# Patient Record
Sex: FEMALE | Race: WHITE | Employment: OTHER | URBAN - METROPOLITAN AREA
[De-identification: names, ages, dates, MRNs, and addresses within clinical notes are randomized per-mention and may not be internally consistent; named-entity substitution may affect disease eponyms.]

---

## 2017-10-12 ENCOUNTER — TRANSFERRED RECORDS (OUTPATIENT)
Dept: HEALTH INFORMATION MANAGEMENT | Facility: CLINIC | Age: 67
End: 2017-10-12

## 2018-02-12 ENCOUNTER — TELEPHONE (OUTPATIENT)
Dept: TRANSPLANT | Facility: CLINIC | Age: 68
End: 2018-02-12

## 2018-02-12 NOTE — TELEPHONE ENCOUNTER
02/12/2018 10:33 AM Phone (Outgoing) Chantal Bernal (Self) [phone] (H) Remove    Missing or Invalid Number - 3rd attempt to complete intake, daughter nbr stated 'mailbox is full, unable to leave message at this time'. called pt mobile nbr, lft a  msg, pts home nbr is an invalid nbr, rang 1x then disconnected. the son's nbr did the same, invalid nbr.             By Shea Sheffield LPN

## 2018-02-12 NOTE — TELEPHONE ENCOUNTER
Late entry:  2/7- 1st attempt to contact pt and complete intake, lft vm msg on mobile ph, the home nbr rang once and disconnected, a non working nbr.     2/8- received med records, 2nd attempt to contact pt to complete intake, lft a vm msg on the mobile nbr and daughter listed as an emergency contact.

## 2018-05-21 ENCOUNTER — DOCUMENTATION ONLY (OUTPATIENT)
Dept: TRANSPLANT | Facility: CLINIC | Age: 68
End: 2018-05-21

## 2018-05-21 NOTE — LETTER
May 21, 2018    Chantal Bernal  [address]  [place] [address]      Dear Ms. Bernal,      You were referred on 1/31/2018 to the Solid Organ Transplant Program by Dr Дмитрий Eddy to begin the evaluation process for lung transplant.  We have made several attempts to get in touch with you, but have not been able to reach you.     If you are interested and/or have any questions, please contact us at [phone] or [phone]. If we do not hear from you in the next 10 business days we will close your referral.     We look forward to hearing from you.     Thank you,    Lung Transplant Team  [facility]                     Sincerely,       ***    CC:***

## 2018-05-21 NOTE — PROGRESS NOTES
Received call back from patient. Briefly discussed intake process. Scheduled patient to completed RN intake interview on 5/30 at 1000.  Discussed possible NPT visit June 18 either AM/PM visit currently available. Patient agrees with plan.

## 2018-05-21 NOTE — PROGRESS NOTES
Called patient to complete NPT intake, discuss transplant process, and schedule NPT visit. Patient was unavailable at time of call. I left a message with contact information requesting patient call back. A plan was made to call patient if no return call occurs. Multiple attempts made to contact patient, will send patient a letter indicating inability to contact patient and potential closure of lung transplant referral if not return call received.

## 2018-05-30 ENCOUNTER — TELEPHONE (OUTPATIENT)
Dept: TRANSPLANT | Facility: CLINIC | Age: 68
End: 2018-05-30

## 2018-05-30 NOTE — TELEPHONE ENCOUNTER
Called patient during prescheduled time to complete lung transplant intake referral interview. No answer, left VM with transplant coordinator contact information, requesting a call back. Will attempt to contact patient again, if not call back will close referral and send letter.

## 2018-06-05 ENCOUNTER — TELEPHONE (OUTPATIENT)
Dept: TRANSPLANT | Facility: CLINIC | Age: 68
End: 2018-06-05

## 2018-07-16 ENCOUNTER — OFFICE VISIT (OUTPATIENT)
Dept: TRANSPLANT | Facility: CLINIC | Age: 68
End: 2018-07-16
Attending: INTERNAL MEDICINE
Payer: MEDICARE

## 2018-07-16 VITALS
OXYGEN SATURATION: 98 % | SYSTOLIC BLOOD PRESSURE: 110 MMHG | WEIGHT: 126.4 LBS | BODY MASS INDEX: 23.12 KG/M2 | HEART RATE: 97 BPM | TEMPERATURE: 98 F | DIASTOLIC BLOOD PRESSURE: 67 MMHG

## 2018-07-16 DIAGNOSIS — R09.02 HYPOXIA: ICD-10-CM

## 2018-07-16 DIAGNOSIS — J43.9 PULMONARY EMPHYSEMA, UNSPECIFIED EMPHYSEMA TYPE (H): Primary | ICD-10-CM

## 2018-07-16 PROCEDURE — G0463 HOSPITAL OUTPT CLINIC VISIT: HCPCS | Mod: ZF

## 2018-07-16 RX ORDER — ALBUTEROL SULFATE 90 UG/1
AEROSOL, METERED RESPIRATORY (INHALATION)
Refills: 0 | COMMUNITY
Start: 2018-06-22

## 2018-07-16 RX ORDER — ACETAMINOPHEN 500 MG
TABLET ORAL EVERY 6 HOURS PRN
COMMUNITY

## 2018-07-16 RX ORDER — ALBUTEROL SULFATE 0.83 MG/ML
SOLUTION RESPIRATORY (INHALATION)
COMMUNITY
Start: 2018-04-19

## 2018-07-16 RX ORDER — BUDESONIDE 1 MG/2ML
1 INHALANT ORAL
COMMUNITY
Start: 2017-05-03

## 2018-07-16 RX ORDER — AZITHROMYCIN 500 MG/1
TABLET, FILM COATED ORAL
Refills: 4 | COMMUNITY
Start: 2018-05-25 | End: 2020-01-01

## 2018-07-16 ASSESSMENT — PAIN SCALES - GENERAL: PAINLEVEL: NO PAIN (0)

## 2018-07-16 NOTE — PATIENT INSTRUCTIONS
Pulmonary Rehab: Please remember to stay active.  Continue exercises learned in pulmonary rehab or continue participating in pulmonary rehab, if able.     Current oxygen use: Rest 0L  Activity 2L  Sleep 2L     Data Unavailable 126 lbs 6.4 oz Body mass index is 23.12 kg/(m^2).  Goal BMI greater than 18, less than 30 for lung transplant.     Medication changes:  No medication changes at this time  Future orders: Will discuss transplant evaluation  Antibody blood test (PRA) due every 3 months:  Will complete with lung transplant evaluation  Next appointment: Will schedule RTC visit with transplant MD    Test or procedures to be complete prior to next appointment:   PFTs: Not completed today, will complete during lung transplant evalaution   6MW: MD requesting a titration study by completed today   CT Scan: Not completed today will complete during lung transplant evaluation    Please remember to stay up to date with your primary care requirements including: Annual check-ups with primary care physician   Mammogram   Pap smear (as appropriate for women)    PSA (for men over 50)   Dental visits   Annual flu shots/ immunizations as needed   Colonoscopy (patients over 50).     Thank-you for allowing us to participate in your care.    If your condition should change, please contact your transplant coordinator. This includes: worsening symptoms, need for antibiotics, hospitalizations, transfusions.    Thoracic Transplant Office phone [phone], option 2, fax [phone]    Office Hours 8:30 - 5:00 pm

## 2018-07-16 NOTE — PROGRESS NOTES
Pre - Lung Transplant Clinic  Initial Consultation  2018       Patient: Chantal Bernal  MRN: [id number]  : 1950 (age 67 year old)    Primary Care Provider: Sujatha Mayfield    HPI:       Dear Dr. Mayfield, today I had the pleasure of meeting Chantal Bernal in the [facility] Pre-Lung Transplantation Clinic to assess her candidacy for moving forward with completing the full pre-lung transplantation evaluation. As you know her history, I will be brief. 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 She report she is unable to walk on a incline.  She is currently on Ventolin 2 puffs q4 prn, albuterol nebs q4 prn, Duonebs q4 prn, Symbicort 160-4.5 2 puffs bid, Prednisone taper with maintenance dose 5 mg daily, Spiriva 1 cap daily, Singulair 10 mg daily, Cetirizine 10 mg daily. She reports she has not recently been to pulmonary rehab. Clinically her subjective complaints and her pulmonary testing (per KIESHA score) suggest she has very advanced obstructive disease and is an apropropriate candidate to be referred for evaluation for Lung Transplantation. I do have several concerns which can be addressed during the pre-lung transplant evaluation which include: Her history of malignancy, osteoporosis, and what appears to be her chronic dependence on steroids. In summary. I believe Chantal Bernal is a good candidate to proceed with the full pre- lung transplantation evaluation   Assessment & Plan:         Recommendations  1. Encourage daily physical activity  2. Strongly encourage participation in Pulmonary Rehabilitation  3. Encourage continued follow-up with your Primary  Pulmonologist for management of your lung disease and with your Primary Care Physician for age- and gender-specific health care maintenance   4. Remain current on immunizations and vaccination  5. Continue ongoing tobacco cessation.     Lung tx consideration:     A. I spent quite some time discussing both the lung transplantation evaluation listing process including complications that can be expected post lung transplantation.      B. One of the main points I did reinforce is that the survival post lung transplantation at this time is around 50 to 55% at 5 years. The main reason for this is infection and/or rejection. While most bacterial infections are treatable, the viral infections can cause significant morbidity and mortality. Acute rejection is usually treatable with high dose steroids but chronic rejection (YOSVANY) as you already know does not have good therapy as of date. The other main point is that there is minimal to no survival advantage with lung transplantation.     C. The lung transplant evaluation will involve multiple tests and meeting with cardiothoracic surgeon, Transplant , Nutritionist etc., from our transplant team. Post testing, we will discuss the details at our transplant meeting and will be listed if she meets the criteria for lung transplantation.      D. Once on the list, Chantal can expect to stay on the list anywhere from few months to few years, depending on the LAS score. Also the other factors that might play a role is number of organs required and whether she is positive for panel reactive antibodies. She has not recieved transfusions to date. She will need to stay within a 50 mile radius of our center for the first three months after the lung transplantation (after hospital discharge).     E. Post lung transplantation, she will require multiple bronchoscopies to evaluate for infections and/or rejections. The major complications post transplantation experienced by most of our  patients include: 1. Diabetes, about 30 to 40% of our patients remain on insulin for the rest of their life. 2. Chronic kidney disease, with majority losing about 50% of the kidney function and up to 5 ot 10% requiring hemodialysis and/or renal transplantation. 3. Hypertension, usually well controlled with medications. 4. Malignancy: Especially of skin cancer, and/or lymphoma - usually treatable. The above is not an exhaustive list of all the complications. The others include also airway complications occurring in about 5% of the patients requiring bronchoscopy with bronchial dilation, sometimes stent placement. There is increased risk for DVT and PE particularly in the first six months after transplantation.      F. Discussed with Chantal that lung transplantation is an option. The other option is to continue as is and continue cares with us or with her local pulmonologist. We will glad to have palliative care team involved in your care to help manage your symptoms.     F. Discussed with Chantal that lung transplantation is an option. If she is not interested in lung tx then will continue current treatment regimen and manage her sx. She can continue cares with us or with her local pulmonologist. We will be glad to have palliative care team involved in your care to help manage your symptoms.    Variable Points on KIESHA* Index: 0 1 2 3   FEV1 (% predicted) > or = 65 50 - 64 36 - 49 < or = 35   6-Minute Walk Test (meters) > or = 350 250 - 349 150 - 249 < or = 149   MMRC Dyspnea Scale 0 - 1 2 3 4   Body Mass Index > or = 21 < or = 21       * KIESHA stands for Body mass index, Airflow Obstruction, Dyspnea per MMRC and Exercise capacity.      Review of Systems:      ROS: 10 point ROS neg other than the symptoms noted above in the HPI.    .I have reviewed the Medications, Allergies, Past Medical and Surgical History, and Social History with the patient    Medical and Surgical History:     Past Medical History:   Diagnosis Date      COPD, severe (H)      Osteoporosis        Past Surgical History:   Procedure Laterality Date     OTHER SURGICAL HISTORY      Stated had a hysterectomy at [facility] around 2012.     Social and Family History:     Social History     Social History     Marital status: Single     Spouse name: N/A     Number of children: N/A     Years of education: N/A     Occupational History     Not on file.     Social History Main Topics     Smoking status: Former Smoker     Packs/day: 1.00     Years: 47.00     Types: Cigarettes     Quit date: 1/1/2012     Smokeless tobacco: Never Used     Alcohol use No     Drug use: No     Sexual activity: Not on file     Other Topics Concern     Not on file     Social History Narrative       Family History   Problem Relation Age of Onset     Cancer Brother      throat     Lung Cancer Paternal Uncle      Allergies and Home Medications:        Allergies   Allergen Reactions     Arformoterol Nausea and Vomiting and Shortness Of Breath     Codeine Unknown and Shortness Of Breath     Trouble breathing     Morphine GI Disturbance         (Not in a hospital admission)  Physical Exam:       GEN: Pleasant, in no acute distress, sitting upright in chair. Looking older than stated age.  HEENT: Pupils equal and reactive to light, conjunctiva are pink conjunctivae, sclera anicteric,  Oral mucosa moist without lesions.  NECK: supple no JVD/LAD  PULM: Good air flow bilaterally, symmetric in expansion, prolonged expiriatory phase, CTA No rhonchi, no wheezes. Breathing non-labored.   CV: Normal S1 and S2. RRR.  No murmur, gallop, or rub.  No peripheral edema.  Peripheral pulses intact.   ABD: Soft, nontender, nondistended. Bowel sound normoactive, no guarding, no rebound.   MSK: .  No apparent muscle wasting. No clubbing, cyanosis, or edema  NEURO: Alert and oriented to person, place, and time. Cn II - XII grossly intact motor 5/5 upper/lower extremity b/l, sensation grossly intact to touch, gait  "normal  SKIN: Warm and dry. No visible rashes or lesions   PSYCH: Mood stable, judgment and insight appropriate.       Data:     Vital signs:  Temp: 98  F (36.7  C) Temp src: Oral BP: 110/67 Pulse: 97     SpO2: 98 %       Weight: 57.3 kg (126 lb 6.4 oz)  Estimated body mass index is 23.12 kg/(m^2) as calculated from the following:    Height as of 2/5/18: 1.575 m (5' 2\").    Weight as of this encounter: 57.3 kg (126 lb 6.4 oz).      Pain: # Pain Assessment:   Chantal s pain level was assessed and she currently denies pain.      Weight trend:   Vitals:    07/16/18 0925   Weight: 57.3 kg (126 lb 6.4 oz)        Labs    PFT interpretation 10/25/17: valid and meets ATS guidelines  1. FVC is   2. FEV1 is 0.65L (35%); pBD FEV1 0.61L (33%)  3. FEV1/FVC 41  4. TLC 7.7L (174%)  5. RV B.05 (345%)  6. DLCO 7.3 (44%)    Echo 11/23/2016 Normal LV/RV fxn, Normal LVEF 65%, Aorta with moderate insufficiency, PAP is not estimated    Assessment: Her pfts is consistent with very severe obstruction, there is not bronchodilator response. Obstruction is supported by the increased RV and TLV. The DLCO is also severely confused. The reduced DLCO in the setting obstruction is consistent with emphysema.    Saud Hoffman MD, [facility]   of Medicine  Pulmonary, Critical Care and Sleep Medicine  [facility]  Pager: [phone]      "

## 2018-07-16 NOTE — LETTER
2018      RE: Chantal Bernal  [address]  [place] [address]               Pre - Lung Transplant Clinic  Initial Consultation  2018       Patient: Chantal Bernal  MRN: [id number]  : 1950 (age 67 year old)    Primary Care Provider: Sujatha Mayfield    Assessment & Plan:     Dear Dr. Mayfield, today I had the pleasure of meeting Chantal Bernal in the [facility] Pre-Lung Transplantation Clinic to assess her candidacy for moving forward with completing the full pre-lung transplantation evaluation. As you know her history, I will be brief. 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 She report she is unable to walk on a incline.  She is currently on Ventolin 2 puffs q4 prn, albuterol nebs q4 prn, Duonebs q4 prn, Symbicort 160-4.5 2 puffs bid, Prednisone taper with maintenance dose 5 mg daily, Spiriva 1 cap daily, Singulair 10 mg daily, Cetirizine 10 mg daily. She reports she has not recently been to pulmonary rehab. Clinically her subjective complaints and her pulmonary testing (per KIESHA score) suggest she has very advanced obstructive disease and is an apropropriate candidate to be referred for evaluation for Lung Transplantation. I do have several concerns which can be addressed during the pre-lung transplant evaluation which include: Her history of malignancy, osteoporosis, and what appears to be her chronic dependence on steroids. In summary. I believe Chantal Bernal is a good candidate to proceed with the full pre- lung transplantation evaluation     Recommendations  1. Encourage daily physical activity  2. Strongly encourage participation in Pulmonary  Rehabilitation  3. Encourage continued follow-up with your Primary Pulmonologist for management of your lung disease and with your Primary Care Physician for age- and gender-specific health care maintenance   4. Remain current on immunizations and vaccination  5. Continue ongoing tobacco cessation.     Lung tx consideration:     A. I spent quite some time discussing both the lung transplantation evaluation listing process including complications that can be expected post lung transplantation.      B. One of the main points I did reinforce is that the survival post lung transplantation at this time is around 50 to 55% at 5 years. The main reason for this is infection and/or rejection. While most bacterial infections are treatable, the viral infections can cause significant morbidity and mortality. Acute rejection is usually treatable with high dose steroids but chronic rejection (YOSVANY) as you already know does not have good therapy as of date. The other main point is that there is minimal to no survival advantage with lung transplantation.     C. The lung transplant evaluation will involve multiple tests and meeting with cardiothoracic surgeon, Transplant , Nutritionist etc., from our transplant team. Post testing, we will discuss the details at our transplant meeting and will be listed if she meets the criteria for lung transplantation.      D. Once on the list, Chantal can expect to stay on the list anywhere from few months to few years, depending on the LAS score. Also the other factors that might play a role is number of organs required and whether she is positive for panel reactive antibodies. She has not recieved transfusions to date. She will need to stay within a 50 mile radius of our center for the first three months after the lung transplantation (after hospital discharge).     E. Post lung transplantation, she will require multiple bronchoscopies to evaluate for infections and/or rejections. The  major complications post transplantation experienced by most of our patients include: 1. Diabetes, about 30 to 40% of our patients remain on insulin for the rest of their life. 2. Chronic kidney disease, with majority losing about 50% of the kidney function and up to 5 ot 10% requiring hemodialysis and/or renal transplantation. 3. Hypertension, usually well controlled with medications. 4. Malignancy: Especially of skin cancer, and/or lymphoma - usually treatable. The above is not an exhaustive list of all the complications. The others include also airway complications occurring in about 5% of the patients requiring bronchoscopy with bronchial dilation, sometimes stent placement. There is increased risk for DVT and PE particularly in the first six months after transplantation.      F. Discussed with Chantal that lung transplantation is an option. The other option is to continue as is and continue cares with us or with her local pulmonologist. We will glad to have palliative care team involved in your care to help manage your symptoms.     F. Discussed with Chantal that lung transplantation is an option. If she is not interested in lung tx then will continue current treatment regimen and manage her sx. She can continue cares with us or with her local pulmonologist. We will be glad to have palliative care team involved in your care to help manage your symptoms.    Variable Points on KIESHA* Index: 0 1 2 3   FEV1 (% predicted) > or = 65 50 - 64 36 - 49 < or = 35   6-Minute Walk Test (meters) > or = 350 250 - 349 150 - 249 < or = 149   MMRC Dyspnea Scale 0 - 1 2 3 4   Body Mass Index > or = 21 < or = 21       * KIESHA stands for Body mass index, Airflow Obstruction, Dyspnea per MMRC and Exercise capacity.      Review of Systems:      ROS: 10 point ROS neg other than the symptoms noted above in the HPI.    .I have reviewed the Medications, Allergies, Past Medical and Surgical History, and Social History with the  patient    Medical and Surgical History:     Past Medical History:   Diagnosis Date     COPD, severe (H)      Osteoporosis        Past Surgical History:   Procedure Laterality Date     OTHER SURGICAL HISTORY      Stated had a hysterectomy at [facility] around 2012.     Social and Family History:     Social History     Social History     Marital status: Single     Spouse name: N/A     Number of children: N/A     Years of education: N/A     Occupational History     Not on file.     Social History Main Topics     Smoking status: Former Smoker     Packs/day: 1.00     Years: 47.00     Types: Cigarettes     Quit date: 1/1/2012     Smokeless tobacco: Never Used     Alcohol use No     Drug use: No     Sexual activity: Not on file     Other Topics Concern     Not on file     Social History Narrative       Family History   Problem Relation Age of Onset     Cancer Brother      throat     Lung Cancer Paternal Uncle      Allergies and Home Medications:        Allergies   Allergen Reactions     Arformoterol Nausea and Vomiting and Shortness Of Breath     Codeine Unknown and Shortness Of Breath     Trouble breathing     Morphine GI Disturbance         (Not in a hospital admission)  Physical Exam:       GEN: Pleasant, in no acute distress, sitting upright in chair. Looking older than stated age.  HEENT: Pupils equal and reactive to light, conjunctiva are pink conjunctivae, sclera anicteric,  Oral mucosa moist without lesions.  NECK: supple no JVD/LAD  PULM: Good air flow bilaterally, symmetric in expansion, prolonged expiriatory phase, CTA No rhonchi, no wheezes. Breathing non-labored.   CV: Normal S1 and S2. RRR.  No murmur, gallop, or rub.  No peripheral edema.  Peripheral pulses intact.   ABD: Soft, nontender, nondistended. Bowel sound normoactive, no guarding, no rebound.   MSK: .  No apparent muscle wasting. No clubbing, cyanosis, or edema  NEURO: Alert and oriented to person, place, and time. Cn II - XII grossly intact  "motor 5/5 upper/lower extremity b/l, sensation grossly intact to touch, gait normal  SKIN: Warm and dry. No visible rashes or lesions   PSYCH: Mood stable, judgment and insight appropriate.       Data:     Vital signs:  Temp: 98  F (36.7  C) Temp src: Oral BP: 110/67 Pulse: 97     SpO2: 98 %       Weight: 57.3 kg (126 lb 6.4 oz)  Estimated body mass index is 23.12 kg/(m^2) as calculated from the following:    Height as of 2/5/18: 1.575 m (5' 2\").    Weight as of this encounter: 57.3 kg (126 lb 6.4 oz).      Pain: # Pain Assessment:   Chantal s pain level was assessed and she currently denies pain.      Weight trend:   Vitals:    07/16/18 0925   Weight: 57.3 kg (126 lb 6.4 oz)        Labs    PFT interpretation 10/25/17: valid and meets ATS guidelines  1. FVC is   2. FEV1 is 0.65L (35%); pBD FEV1 0.61L (33%)  3. FEV1/FVC 41  4. TLC 7.7L (174%)  5. RV B.05 (345%)  6. DLCO 7.3 (44%)    Echo 11/23/2016 Normal LV/RV fxn, Normal LVEF 65%, Aorta with moderate insufficiency, PAP is not estimated    Assessment: Her pfts is consistent with very severe obstruction, there is not bronchodilator response. Obstruction is supported by the increased RV and TLV. The DLCO is also severely confused. The reduced DLCO in the setting obstruction is consistent with emphysema.    Saud Hoffman MD, [facility]   of Medicine  Pulmonary, Critical Care and Sleep Medicine  [facility]  Pager: [phone]        "

## 2018-07-16 NOTE — NURSING NOTE
Chief Complaint   Patient presents with     Consult     Possible Lung TX     /67 (BP Location: Right arm)  Pulse 97  Temp 98  F (36.7  C) (Oral)  Wt 57.3 kg (126 lb 6.4 oz)  SpO2 98%  BMI 23.12 kg/m2   Lena Vigil

## 2018-07-16 NOTE — MR AVS SNAPSHOT
After Visit Summary   7/16/2018    Chantal Bernal    MRN: [id number]           Patient Information     Date Of Birth          1950        Visit Information        Provider Department      7/16/2018 9:20 AM Saud Hoffman MD [facility] Solid Organ Transplant        Today's Diagnoses     Pulmonary emphysema, unspecified emphysema type (H)    -  1    Hypoxia          Care Instructions    Pulmonary Rehab: Please remember to stay active.  Continue exercises learned in pulmonary rehab or continue participating in pulmonary rehab, if able.     Current oxygen use: Rest 0L  Activity 2L  Sleep 2L     Data Unavailable 126 lbs 6.4 oz Body mass index is 23.12 kg/(m^2).  Goal BMI greater than 18, less than 30 for lung transplant.     Medication changes:  No medication changes at this time  Future orders: Will discuss transplant evaluation  Antibody blood test (PRA) due every 3 months:  Will complete with lung transplant evaluation  Next appointment: Will schedule RTC visit with transplant MD    Test or procedures to be complete prior to next appointment:   PFTs: Not completed today, will complete during lung transplant evalaution   6MW: MD requesting a titration study by completed today   CT Scan: Not completed today will complete during lung transplant evaluation    Please remember to stay up to date with your primary care requirements including: Annual check-ups with primary care physician   Mammogram   Pap smear (as appropriate for women)    PSA (for men over 50)   Dental visits   Annual flu shots/ immunizations as needed   Colonoscopy (patients over 50).     Thank-you for allowing us to participate in your care.    If your condition should change, please contact your transplant coordinator. This includes: worsening symptoms, need for antibiotics, hospitalizations, transfusions.    Thoracic Transplant Office phone [phone], option 2, fax [phone]    Office Hours 8:30 - 5:00 pm                 "Follow-ups after your visit        Future tests that were ordered for you today     Open Future Orders        Priority Expected Expires Ordered    6 minute walk test Routine 2018            Who to contact     If you have questions or need follow up information about today's clinic visit or your schedule please contact [facility] SOLID ORGAN TRANSPLANT directly at [phone].  Normal or non-critical lab and imaging results will be communicated to you by MyChart, letter or phone within 4 business days after the clinic has received the results. If you do not hear from us within 7 days, please contact the clinic through [employer] or phone. If you have a critical or abnormal lab result, we will notify you by phone as soon as possible.  Submit refill requests through [employer] or call your pharmacy and they will forward the refill request to us. Please allow 3 business days for your refill to be completed.          Additional Information About Your Visit        [employer] Information     [employer] lets you send messages to your doctor, view your test results, renew your prescriptions, schedule appointments and more. To sign up, go to [email or web address] . Click on \"Log in\" on the left side of the screen, which will take you to the Welcome page. Then click on \"Sign up Now\" on the right side of the page.     You will be asked to enter the access code listed below, as well as some personal information. Please follow the directions to create your username and password.     Your access code is: [email or web address]  Expires: 10/14/2018  6:30 AM     Your access code will  in 90 days. If you need help or a new code, please call your [facility] clinic or [phone].        Care EveryWhere ID     This is your Care EveryWhere ID. This could be used by other organizations to access your [facility] medical records  [id number]        Your Vitals Were     Pulse Temperature Pulse Oximetry BMI (Body Mass Index)          " 97 98  F (36.7  C) (Oral) 98% 23.12 kg/m2         Blood Pressure from Last 3 Encounters:   07/16/18 110/67    Weight from Last 3 Encounters:   07/16/18 57.3 kg (126 lb 6.4 oz)   02/05/18 57.2 kg (126 lb)               Primary Care Provider Office Phone # Fax #    Sujatha Mayfield PA-C [phone]       [place] [address]        Equal Access to Services     JOSE LUTHER : Hadii aad ku hadasho Soomaali, waaxda luqadaha, qaybta kaalmada adeegyada, waxay idiin hayaan adeeg khsophie mcnair . So [facility] [phone].    ATENCIÓN: Si habla español, tiene a paula disposición servicios gratuitos de asistencia lingüística. [facility] [phone].    We comply with applicable federal civil rights laws and [place] laws. We do not discriminate on the basis of race, color, national origin, age, disability, sex, sexual orientation, or gender identity.            Thank you!     Thank you for choosing [facility] SOLID ORGAN TRANSPLANT  for your care. Our goal is always to provide you with excellent care. Hearing back from our patients is one way we can continue to improve our services. Please take a few minutes to complete the written survey that you may receive in the mail after your visit with us. Thank you!             Your Updated Medication List - Protect others around you: Learn how to safely use, store and throw away your medicines at www.disposemymeds.org.          This list is accurate as of 7/16/18 11:00 AM.  Always use your most recent med list.                   Brand Name Dispense Instructions for use Diagnosis    * albuterol (2.5 MG/3ML) 0.083% neb solution      TAKE 3 ML IN NEBULIZER EVERY FOUR HOURS AS NEEDED FOR SHORTNESS OF BREATH        * VENTOLIN  (90 Base) MCG/ACT Inhaler   Generic drug:  albuterol           azithromycin 500 MG tablet    ZITHROMAX          budesonide 1 MG/2ML Susp neb solution    PULMICORT     Inhale 1 mg into the lungs        TYLENOL 500 MG  tablet   Generic drug:  acetaminophen           * Notice:  This list has 2 medication(s) that are the same as other medications prescribed for you. Read the directions carefully, and ask your doctor or other care provider to review them with you.

## 2018-07-17 ENCOUNTER — TELEPHONE (OUTPATIENT)
Dept: TRANSPLANT | Facility: CLINIC | Age: 68
End: 2018-07-17

## 2018-07-17 NOTE — NURSING NOTE
"Patient accompanied by: By her daughters    Current activity level: Patient reports she is physically limited by her dyspnea stating she can only walk approximately 1/4- 1/2 a city block before her shortness of breath causes her to stop and rest. Patient did report she is able to cook, clean, and complete most basic ADL's but did state she has significant shortness of breath while completing them.      Pulmonary Rehab: Patient reported she had completed 2017 at [facility] in [place].     Recommendations: MD requesting patient complete a 6MW titration study to reassess 02 needs with activity.     Patient status assessment updated in transplant tab.   Karnofsky score: 70% - Cares for Self: Unable to Carry on Normal Activity or Active Work   Physical capacity: Limited Mobility     Current oxygen use:  0L Rest, 2L Activity, 2L Sleep    Patient reported she does not use her O2 with activity as prescribed. When asked what barriers are preventing her from using her O2 with activity, she stated she did not have any barriers she \"just doesn't like to use it\". MD educated patient on use of O2, and negative impacts on health specifically her heart health if she did not use O2 consistently as prescribed.      Diabetic status: Not a diabetic, no prescribed diabetic medications.     Data Unavailable 126 lbs 6.4 oz Body mass index is 23.12 kg/(m^2).    Medication changes: No medications changes made this visit    Plan:   (1.) 6MW with titration to be completed at home facility.   (2.) Patient encouraged to become compliant with O2 use prior to lung transplant consideration.     "

## 2018-07-17 NOTE — TELEPHONE ENCOUNTER
Contacted Tania Anglin at [facility] pulmonary Therapy. Coordinated care and sent orders for 6MW with titration study to be completed. Tania reported she has cared for Chantal in the past, and confirmed patient has been educated on need to wear O2 with activity but has history of noncompliance.  Updated Dr Hoffman. Provided local pulmonary rehab with clinical fax to send 6MW results.

## 2018-09-14 ENCOUNTER — TELEPHONE (OUTPATIENT)
Dept: TRANSPLANT | Facility: CLINIC | Age: 68
End: 2018-09-14

## 2018-09-14 NOTE — TELEPHONE ENCOUNTER
Returned call to patient inquiring about next steps in transplant process.  Plan at NPT clinic visit was for patient to become more compliant with her O2 as she was not using it as well as complete a titrations study at her local pulmonary clinic to determine if her current O2 orders were appropriate for her oxygenation needs.    Left VM to return my call to discuss. Awaiting call back.

## 2018-09-17 ENCOUNTER — TELEPHONE (OUTPATIENT)
Dept: TRANSPLANT | Facility: CLINIC | Age: 68
End: 2018-09-17

## 2018-09-17 NOTE — TELEPHONE ENCOUNTER
Received call back from patient. Discussed next steps regarding lung txp referral. Reviewed basic of week long lung trasnplant evaluation. Patient to discuss timing with family and will call back with dates to schedule lung transplant evaluation.

## 2018-09-18 ENCOUNTER — DOCUMENTATION ONLY (OUTPATIENT)
Dept: TRANSPLANT | Facility: CLINIC | Age: 68
End: 2018-09-18

## 2018-10-29 ENCOUNTER — HOSPITAL ENCOUNTER (OUTPATIENT)
Facility: CLINIC | Age: 68
End: 2018-10-29
Attending: INTERNAL MEDICINE | Admitting: INTERNAL MEDICINE
Payer: MEDICARE

## 2018-10-29 DIAGNOSIS — Z76.82 ORGAN TRANSPLANT CANDIDATE: ICD-10-CM

## 2018-10-29 DIAGNOSIS — Z11.59 ENCOUNTER FOR SCREENING FOR OTHER VIRAL DISEASES: ICD-10-CM

## 2018-10-29 DIAGNOSIS — Z79.51 LONG TERM CURRENT USE OF INHALED STEROID: ICD-10-CM

## 2018-10-29 DIAGNOSIS — Z79.899 OTHER LONG TERM (CURRENT) DRUG THERAPY: ICD-10-CM

## 2018-10-29 DIAGNOSIS — J96.21 ACUTE AND CHRONIC RESPIRATORY FAILURE WITH HYPOXIA (H): ICD-10-CM

## 2018-10-29 DIAGNOSIS — Z12.11 ENCOUNTER FOR SCREENING FOR MALIGNANT NEOPLASM OF COLON: ICD-10-CM

## 2018-10-29 DIAGNOSIS — J44.9 COPD (CHRONIC OBSTRUCTIVE PULMONARY DISEASE) (H): Primary | ICD-10-CM

## 2018-10-29 DIAGNOSIS — R93.89 ABNORMAL CT SCAN, CHEST: ICD-10-CM

## 2018-10-29 NOTE — PROGRESS NOTES
Patient last seen in July 2018 for a NPT visit with Dr Hoffman. Lung transplant process both pre/post discussed with patient. Dr Hoffman determined there were no contra-indications to moving forward with lung transplant evaluation. Patient scheduled for lung transplant evaluation the week of Nov 12 2018. Orders placed for all standards lung transplant evaluation testing, procedures, and consults.

## 2018-10-29 NOTE — Clinical Note
Please schedule patient for full lung transplant evaluation week of 11/12/2018. Will need complete PFTs and Pulmonary Consult visit with Dr Hoffman if available.

## 2018-11-08 ENCOUNTER — TELEPHONE (OUTPATIENT)
Dept: TRANSPLANT | Facility: CLINIC | Age: 68
End: 2018-11-08

## 2018-11-08 RX ORDER — LIDOCAINE 40 MG/G
CREAM TOPICAL
Status: CANCELLED | OUTPATIENT
Start: 2018-11-08

## 2018-11-08 RX ORDER — SODIUM CHLORIDE 9 MG/ML
INJECTION, SOLUTION INTRAVENOUS CONTINUOUS
Status: CANCELLED | OUTPATIENT
Start: 2018-11-08

## 2018-11-08 NOTE — TELEPHONE ENCOUNTER
Called patient for Pre Lung Transplant Evaluation Schedule review.   Patient to complete Pre Lung Transplant Evaluation the week of Nov 12-16, pulmonary and palliative appointments scheduled Nov 27.   Reviewed all testing and provided rationale and answered patient questions.     Full schedule available in Letters Tab of EMR.

## 2018-11-12 ENCOUNTER — HOSPITAL ENCOUNTER (OUTPATIENT)
Dept: NUCLEAR MEDICINE | Facility: CLINIC | Age: 68
Setting detail: NUCLEAR MEDICINE
End: 2018-11-12
Attending: INTERNAL MEDICINE
Payer: MEDICARE

## 2018-11-12 ENCOUNTER — RADIANT APPOINTMENT (OUTPATIENT)
Dept: CT IMAGING | Facility: CLINIC | Age: 68
End: 2018-11-12
Attending: INTERNAL MEDICINE
Payer: MEDICARE

## 2018-11-12 ENCOUNTER — RADIANT APPOINTMENT (OUTPATIENT)
Dept: GENERAL RADIOLOGY | Facility: CLINIC | Age: 68
End: 2018-11-12
Attending: INTERNAL MEDICINE
Payer: MEDICARE

## 2018-11-12 ENCOUNTER — RADIANT APPOINTMENT (OUTPATIENT)
Dept: BONE DENSITY | Facility: CLINIC | Age: 68
End: 2018-11-12
Attending: INTERNAL MEDICINE
Payer: MEDICARE

## 2018-11-12 ENCOUNTER — ALLIED HEALTH/NURSE VISIT (OUTPATIENT)
Dept: TRANSPLANT | Facility: CLINIC | Age: 68
End: 2018-11-12
Attending: INTERNAL MEDICINE
Payer: MEDICARE

## 2018-11-12 ENCOUNTER — HOSPITAL ENCOUNTER (OUTPATIENT)
Dept: CARDIOLOGY | Facility: CLINIC | Age: 68
Discharge: HOME | End: 2018-11-12
Attending: INTERNAL MEDICINE | Admitting: INTERNAL MEDICINE
Payer: MEDICARE

## 2018-11-12 VITALS
HEART RATE: 87 BPM | TEMPERATURE: 98 F | BODY MASS INDEX: 23.41 KG/M2 | WEIGHT: 124 LBS | OXYGEN SATURATION: 95 % | SYSTOLIC BLOOD PRESSURE: 129 MMHG | RESPIRATION RATE: 16 BRPM | HEIGHT: 61 IN | DIASTOLIC BLOOD PRESSURE: 79 MMHG

## 2018-11-12 DIAGNOSIS — J44.9 COPD (CHRONIC OBSTRUCTIVE PULMONARY DISEASE) (H): ICD-10-CM

## 2018-11-12 DIAGNOSIS — R93.89 ABNORMAL CT SCAN, CHEST: ICD-10-CM

## 2018-11-12 DIAGNOSIS — Z76.82 ORGAN TRANSPLANT CANDIDATE: ICD-10-CM

## 2018-11-12 DIAGNOSIS — Z79.51 LONG TERM CURRENT USE OF INHALED STEROID: ICD-10-CM

## 2018-11-12 DIAGNOSIS — Z79.899 OTHER LONG TERM (CURRENT) DRUG THERAPY: ICD-10-CM

## 2018-11-12 DIAGNOSIS — J96.21 ACUTE AND CHRONIC RESPIRATORY FAILURE WITH HYPOXIA (H): ICD-10-CM

## 2018-11-12 DIAGNOSIS — Z11.59 ENCOUNTER FOR SCREENING FOR OTHER VIRAL DISEASES: ICD-10-CM

## 2018-11-12 LAB
ABO + RH BLD: NORMAL
ALBUMIN SERPL-MCNC: 3.1 G/DL (ref 3.4–5)
ALBUMIN UR-MCNC: NEGATIVE MG/DL
ALP SERPL-CCNC: 62 U/L (ref 40–150)
ALT SERPL W P-5'-P-CCNC: 23 U/L (ref 0–50)
AMYLASE SERPL-CCNC: 60 U/L (ref 30–110)
ANION GAP SERPL CALCULATED.3IONS-SCNC: 4 MMOL/L (ref 3–14)
APPEARANCE UR: CLEAR
AST SERPL W P-5'-P-CCNC: 17 U/L (ref 0–45)
BASE EXCESS BLDV CALC-SCNC: 6.6 MMOL/L
BASOPHILS # BLD AUTO: 0 10E9/L (ref 0–0.2)
BASOPHILS NFR BLD AUTO: 0.3 %
BILIRUB SERPL-MCNC: 0.4 MG/DL (ref 0.2–1.3)
BILIRUB UR QL STRIP: NEGATIVE
BLD GP AB SCN SERPL QL: NORMAL
BLD GP AB SCN TITR SERPL: NORMAL {TITER}
BLOOD BANK CMNT PATIENT-IMP: NORMAL
BUN SERPL-MCNC: 20 MG/DL (ref 7–30)
CALCIUM SERPL-MCNC: 8.4 MG/DL (ref 8.5–10.1)
CHLORIDE SERPL-SCNC: 103 MMOL/L (ref 94–109)
CHOLEST SERPL-MCNC: 231 MG/DL
CMV IGG SERPL QL IA: >8 AI (ref 0–0.8)
CO2 SERPL-SCNC: 31 MMOL/L (ref 20–32)
COLOR UR AUTO: YELLOW
CREAT SERPL-MCNC: 1.3 MG/DL (ref 0.52–1.04)
DEPRECATED CALCIDIOL+CALCIFEROL SERPL-MC: 102 UG/L (ref 20–75)
DIFFERENTIAL METHOD BLD: ABNORMAL
EBV VCA IGG SER QL IA: 6.6 AI (ref 0–0.8)
EOSINOPHIL # BLD AUTO: 0.1 10E9/L (ref 0–0.7)
EOSINOPHIL NFR BLD AUTO: 1.3 %
ERYTHROCYTE [DISTWIDTH] IN BLOOD BY AUTOMATED COUNT: 13.2 % (ref 10–15)
GFR SERPL CREATININE-BSD FRML MDRD: 41 ML/MIN/1.7M2
GLUCOSE SERPL-MCNC: 84 MG/DL (ref 70–99)
GLUCOSE UR STRIP-MCNC: NEGATIVE MG/DL
HAV IGG SER QL IA: REACTIVE
HBA1C MFR BLD: 5.3 % (ref 0–5.6)
HBV CORE AB SERPL QL IA: NONREACTIVE
HBV SURFACE AB SERPL IA-ACNC: 0 M[IU]/ML
HBV SURFACE AG SERPL QL IA: NONREACTIVE
HCO3 BLDV-SCNC: 34 MMOL/L (ref 21–28)
HCT VFR BLD AUTO: 35.4 % (ref 35–47)
HCV AB SERPL QL IA: NONREACTIVE
HDLC SERPL-MCNC: 65 MG/DL
HGB BLD-MCNC: 11.4 G/DL (ref 11.7–15.7)
HGB UR QL STRIP: NEGATIVE
HIV 1+2 AB+HIV1 P24 AG SERPL QL IA: NONREACTIVE
HSV1 IGG SERPL QL IA: 1.9 AI (ref 0–0.8)
HSV2 IGG SERPL QL IA: <0.2 AI (ref 0–0.8)
IGA SERPL-MCNC: 47 MG/DL (ref 70–380)
IGM SERPL-MCNC: 56 MG/DL (ref 60–265)
IMM GRANULOCYTES # BLD: 0.1 10E9/L (ref 0–0.4)
IMM GRANULOCYTES NFR BLD: 0.8 %
KETONES UR STRIP-MCNC: NEGATIVE MG/DL
LDLC SERPL CALC-MCNC: 136 MG/DL
LEUKOCYTE ESTERASE UR QL STRIP: ABNORMAL
LYMPHOCYTES # BLD AUTO: 1.8 10E9/L (ref 0.8–5.3)
LYMPHOCYTES NFR BLD AUTO: 19.6 %
MAGNESIUM SERPL-MCNC: 2.1 MG/DL (ref 1.6–2.3)
MCH RBC QN AUTO: 31.6 PG (ref 26.5–33)
MCHC RBC AUTO-ENTMCNC: 32.2 G/DL (ref 31.5–36.5)
MCV RBC AUTO: 98 FL (ref 78–100)
MONOCYTES # BLD AUTO: 0.7 10E9/L (ref 0–1.3)
MONOCYTES NFR BLD AUTO: 7.5 %
MUCOUS THREADS #/AREA URNS LPF: PRESENT /LPF
NEUTROPHILS # BLD AUTO: 6.3 10E9/L (ref 1.6–8.3)
NEUTROPHILS NFR BLD AUTO: 70.5 %
NITRATE UR QL: NEGATIVE
NONHDLC SERPL-MCNC: 166 MG/DL
NRBC # BLD AUTO: 0 10*3/UL
NRBC BLD AUTO-RTO: 0 /100
O2/TOTAL GAS SETTING VFR VENT: 21 %
OXYHGB MFR BLDV: 22 %
PCO2 BLDV: 61 MM HG (ref 40–50)
PH BLDV: 7.35 PH (ref 7.32–7.43)
PH UR STRIP: 6 PH (ref 5–7)
PHOSPHATE SERPL-MCNC: 2.1 MG/DL (ref 2.5–4.5)
PLATELET # BLD AUTO: 228 10E9/L (ref 150–450)
PO2 BLDV: 17 MM HG (ref 25–47)
POTASSIUM SERPL-SCNC: 3.8 MMOL/L (ref 3.4–5.3)
PREALB SERPL IA-MCNC: 32 MG/DL (ref 15–45)
PROT SERPL-MCNC: 6.3 G/DL (ref 6.8–8.8)
RBC # BLD AUTO: 3.61 10E12/L (ref 3.8–5.2)
RBC #/AREA URNS AUTO: 3 /HPF (ref 0–2)
SODIUM SERPL-SCNC: 139 MMOL/L (ref 133–144)
SOURCE: ABNORMAL
SP GR UR STRIP: 1.01 (ref 1–1.03)
SPECIMEN EXP DATE BLD: NORMAL
SPECIMEN EXP DATE BLD: NORMAL
SQUAMOUS #/AREA URNS AUTO: 2 /HPF (ref 0–1)
T GONDII IGG SER QL: 16.8 IU/ML (ref 0–7.1)
TRIGL SERPL-MCNC: 146 MG/DL
TSH SERPL DL<=0.005 MIU/L-ACNC: 0.96 MU/L (ref 0.4–4)
UROBILINOGEN UR STRIP-MCNC: 0 MG/DL (ref 0–2)
VZV IGG SER QL IA: 0.9 AI (ref 0–0.8)
WBC # BLD AUTO: 8.9 10E9/L (ref 4–11)
WBC #/AREA URNS AUTO: 13 /HPF (ref 0–5)

## 2018-11-12 PROCEDURE — 82150 ASSAY OF AMYLASE: CPT | Performed by: INTERNAL MEDICINE

## 2018-11-12 PROCEDURE — 81001 URINALYSIS AUTO W/SCOPE: CPT | Performed by: INTERNAL MEDICINE

## 2018-11-12 PROCEDURE — 82784 ASSAY IGA/IGD/IGG/IGM EACH: CPT | Performed by: INTERNAL MEDICINE

## 2018-11-12 PROCEDURE — 87086 URINE CULTURE/COLONY COUNT: CPT | Performed by: INTERNAL MEDICINE

## 2018-11-12 PROCEDURE — 86803 HEPATITIS C AB TEST: CPT | Performed by: INTERNAL MEDICINE

## 2018-11-12 PROCEDURE — 84134 ASSAY OF PREALBUMIN: CPT | Performed by: INTERNAL MEDICINE

## 2018-11-12 PROCEDURE — 86901 BLOOD TYPING SEROLOGIC RH(D): CPT | Performed by: INTERNAL MEDICINE

## 2018-11-12 PROCEDURE — 25500064 ZZH RX 255 OP 636: Performed by: INTERNAL MEDICINE

## 2018-11-12 PROCEDURE — 86706 HEP B SURFACE ANTIBODY: CPT | Performed by: INTERNAL MEDICINE

## 2018-11-12 PROCEDURE — 80061 LIPID PANEL: CPT | Performed by: INTERNAL MEDICINE

## 2018-11-12 PROCEDURE — 93306 TTE W/DOPPLER COMPLETE: CPT | Mod: 26 | Performed by: INTERNAL MEDICINE

## 2018-11-12 PROCEDURE — 86695 HERPES SIMPLEX TYPE 1 TEST: CPT | Performed by: INTERNAL MEDICINE

## 2018-11-12 PROCEDURE — 86480 TB TEST CELL IMMUN MEASURE: CPT | Performed by: INTERNAL MEDICINE

## 2018-11-12 PROCEDURE — 87186 SC STD MICRODIL/AGAR DIL: CPT | Performed by: INTERNAL MEDICINE

## 2018-11-12 PROCEDURE — 82306 VITAMIN D 25 HYDROXY: CPT | Performed by: INTERNAL MEDICINE

## 2018-11-12 PROCEDURE — 83735 ASSAY OF MAGNESIUM: CPT | Performed by: INTERNAL MEDICINE

## 2018-11-12 PROCEDURE — 78580 LUNG PERFUSION IMAGING: CPT

## 2018-11-12 PROCEDURE — 40000269 ZZH STATISTIC NO CHARGE FACILITY FEE: Mod: ZF

## 2018-11-12 PROCEDURE — 85025 COMPLETE CBC W/AUTO DIFF WBC: CPT | Performed by: INTERNAL MEDICINE

## 2018-11-12 PROCEDURE — 87340 HEPATITIS B SURFACE AG IA: CPT | Performed by: INTERNAL MEDICINE

## 2018-11-12 PROCEDURE — 86850 RBC ANTIBODY SCREEN: CPT | Performed by: INTERNAL MEDICINE

## 2018-11-12 PROCEDURE — 86644 CMV ANTIBODY: CPT | Performed by: INTERNAL MEDICINE

## 2018-11-12 PROCEDURE — 83036 HEMOGLOBIN GLYCOSYLATED A1C: CPT | Performed by: INTERNAL MEDICINE

## 2018-11-12 PROCEDURE — 86905 BLOOD TYPING RBC ANTIGENS: CPT | Performed by: INTERNAL MEDICINE

## 2018-11-12 PROCEDURE — 34300033 ZZH RX 343: Performed by: INTERNAL MEDICINE

## 2018-11-12 PROCEDURE — 86886 COOMBS TEST INDIRECT TITER: CPT | Performed by: INTERNAL MEDICINE

## 2018-11-12 PROCEDURE — 82657 ENZYME CELL ACTIVITY: CPT | Performed by: INTERNAL MEDICINE

## 2018-11-12 PROCEDURE — 84443 ASSAY THYROID STIM HORMONE: CPT | Performed by: INTERNAL MEDICINE

## 2018-11-12 PROCEDURE — 86787 VARICELLA-ZOSTER ANTIBODY: CPT | Performed by: INTERNAL MEDICINE

## 2018-11-12 PROCEDURE — 87088 URINE BACTERIA CULTURE: CPT | Performed by: INTERNAL MEDICINE

## 2018-11-12 PROCEDURE — 84100 ASSAY OF PHOSPHORUS: CPT | Performed by: INTERNAL MEDICINE

## 2018-11-12 PROCEDURE — 82785 ASSAY OF IGE: CPT | Performed by: INTERNAL MEDICINE

## 2018-11-12 PROCEDURE — 86696 HERPES SIMPLEX TYPE 2 TEST: CPT | Performed by: INTERNAL MEDICINE

## 2018-11-12 PROCEDURE — A9540 TC99M MAA: HCPCS | Performed by: INTERNAL MEDICINE

## 2018-11-12 PROCEDURE — 40000866 ZZHCL STATISTIC HIV 1/2 ANTIGEN/ANTIBODY PRETRANSPLANT ONLY: Performed by: INTERNAL MEDICINE

## 2018-11-12 PROCEDURE — 86708 HEPATITIS A ANTIBODY: CPT | Performed by: INTERNAL MEDICINE

## 2018-11-12 PROCEDURE — 86900 BLOOD TYPING SEROLOGIC ABO: CPT | Performed by: INTERNAL MEDICINE

## 2018-11-12 PROCEDURE — 80053 COMPREHEN METABOLIC PANEL: CPT | Performed by: INTERNAL MEDICINE

## 2018-11-12 PROCEDURE — 80323 ALKALOIDS NOS: CPT | Performed by: INTERNAL MEDICINE

## 2018-11-12 PROCEDURE — 82787 IGG 1 2 3 OR 4 EACH: CPT | Performed by: INTERNAL MEDICINE

## 2018-11-12 PROCEDURE — 86777 TOXOPLASMA ANTIBODY: CPT | Performed by: INTERNAL MEDICINE

## 2018-11-12 PROCEDURE — 86704 HEP B CORE ANTIBODY TOTAL: CPT | Performed by: INTERNAL MEDICINE

## 2018-11-12 PROCEDURE — 40000264 ECHO COMPLETE BUBBLE STUDY WITH OPTISON

## 2018-11-12 PROCEDURE — 82805 BLOOD GASES W/O2 SATURATION: CPT | Performed by: INTERNAL MEDICINE

## 2018-11-12 PROCEDURE — 86665 EPSTEIN-BARR CAPSID VCA: CPT | Performed by: INTERNAL MEDICINE

## 2018-11-12 RX ORDER — ACYCLOVIR 200 MG/1
30 CAPSULE ORAL ONCE
Status: DISCONTINUED | OUTPATIENT
Start: 2018-11-12 | End: 2018-11-13 | Stop reason: HOSPADM

## 2018-11-12 RX ADMIN — Medication 6.2 MILLICURIE: at 13:23

## 2018-11-12 RX ADMIN — HUMAN ALBUMIN MICROSPHERES AND PERFLUTREN 6 ML: 10; .22 INJECTION, SOLUTION INTRAVENOUS at 14:45

## 2018-11-12 NOTE — NURSING NOTE
Pt came in today per Dr. Hoffman for full vitals including shoe off height and weight for pre lung eval    Andie Walsh [facility]  11/12/2018 8:41 AM

## 2018-11-12 NOTE — MR AVS SNAPSHOT
After Visit Summary   11/12/2018    Chantal Bernal    MRN: [id number]           Patient Information     Date Of Birth          1950        Visit Information        Provider Department      11/12/2018 7:45 AM Nurse, [facility] Solid Organ Transplant        Today's Diagnoses     COPD (chronic obstructive pulmonary disease) (H)        Abnormal CT scan, chest        Organ transplant candidate           Follow-ups after your visit        Your next 10 appointments already scheduled     Nov 12, 2018  8:50 AM CST   XR THORACIC SPINE 2 VIEWS with [address] [facility] ([facility])    [address] [place] [address] [phone]           How do I prepare for my exam? (Food and drink instructions) No Food and Drink Restrictions.  How do I prepare for my exam? (Other instructions) You do not need to do anything special for this exam.  What should I wear: Wear comfortable clothes.  How long does the exam take: Most scans take less than 5 minutes.  What should I bring: Bring a list of your medicines, including vitamins, minerals and over-the-counter drugs. It is safest to leave personal items at home.  Do I need a :  No  is needed.  What do I need to tell my doctor: Tell your doctor if there s any chance you are pregnant.  What should I do after the exam: No restrictions, You may resume normal activities.  What is this test: An image of a specific body part shown in shades of black and white.  Who should I call with questions: If you have any questions, please call the Imaging Department where you will have your exam. Directions, parking instructions, and other information is available on our website, [email or web address].            Nov 12, 2018  9:10 AM CST   XR PELVIS AND HIP BILATERAL 2 VIEWS with [address] [facility] ([facility])    [address] [place] [address]  [place] [address]   [phone]           How do I prepare for my exam? (Food and drink instructions) No Food and Drink Restrictions.  How do I prepare for my exam? (Other instructions) You do not need to do anything special for this exam.  What should I wear: Wear comfortable clothes.  How long does the exam take: Most scans take less than 5 minutes.  What should I bring: Bring a list of your medicines, including vitamins, minerals and over-the-counter drugs. It is safest to leave personal items at home.  Do I need a :  No  is needed.  What do I need to tell my doctor: Tell your doctor if there s any chance you are pregnant.  What should I do after the exam: No restrictions, You may resume normal activities.  What is this test: An image of a specific body part shown in shades of black and white.  Who should I call with questions: If you have any questions, please call the Imaging Department where you will have your exam. Directions, parking instructions, and other information is available on our website, [email or web address].            Nov 12, 2018  9:30 AM CST   DX HIP/PELVIS/SPINE with UCDX1   [facility] [place] [facility] ([facility])    [address] [address] [phone]           How do I prepare for my exam? (Food and drink instructions) No Food and Drink Restrictions.  How do I prepare for my exam? (Other instructions) Please do not take any of the following 24 hours prior to the day of your exam: vitamins, calcium tablets, antacids.  What should I wear: If possible, please wear clothes without metal (snaps, zippers). A sweat suit works well.  How long does the exam take: The exam takes about 20 minutes.  What should I bring: Bring a list of your current medicines to your exam (including vitamins, minerals and over-the-counter drugs).  Do I need a :  No  is needed.  What should I do after the exam: No restrictions, You may  resume normal activities.  How do I prepare for my exam? (Food and drink instructions) A DEXA scan is a bone-density scan. It uses a low level of radiation to check the strength of your bones. As you lie on a padded table, a machine will take X-rays. We most often scan the hips and lower spine.  Who should I call with questions: If you have any questions, please call the Imaging Department where you will have your exam. Directions, parking instructions, and other information is available on our website, [email or web address].            Nov 12, 2018 11:00 AM CST   (Arrive by 10:45 AM)   SOT SOCIAL WORK EVAL with [place], [facility] Solid Organ Transplant ([facility])    [address] [facility]  [address]  [facility] [id number] [phone]            Nov 12, 2018  1:00 PM CST   NM LUNG SCAN PERFUSION PARTICULATE with UUNM3   [facility] [place], Nuclear Medicine ([facility])    [address] [facility] [id number] [phone]           How do I prepare for my exam? (Food and drink instructions) Patients should eat a low carb, high protein meal 7 hours (or the last meal you eat) before the scan. Low carb, high protein meals consist of lean meats, seafood, beans, soy, low-fat dairy, eggs, nuts & seeds.  6 hours before your scan: Stop all food and liquids (except water). Do not chew gum or suck on mints. If you need to take medicine with food, you may take it with a few crackers.  How do I prepare for my exam? (Other instructions) If you have diabetes: Have your exam early in the morning. Your blood glucose will go up as the day goes by. Your glucose level must be 180 or less at the start of the exam. Please take any oral diabetic medication you need to ensure this blood glucose level is below 180, but no insulin 4 hours prior to the exam.  * If you are taking insulin in the morning take with breakfast by 6 am and schedule  procedure between 12 and 2:15 pm. * If you are taking insulin at night take nightly dose, fast overnight, schedule procedure before 10 am. * If you take insulin both morning and night take morning dose by 6am and schedule procedure between 12 and 2:15 pm.  24 hours before your scan: Don t do any heavy exercise. (No jogging, aerobics or other workouts.) Exercise will make your pictures less accurate.  What should I wear? Please wear comfortable clothes.  How long does the exam take?  Most scans will take between 2-3 hours.  What should I bring: Please bring a list of your medicines (including vitamins, minerals and over-the-counter drugs). Leave your valuables at home.  Do I need a :  No  is needed.  What do I need to tell my doctor? Tell your doctor: * If there is any chance you may be pregnant or if you are breastfeeding. * If you have problems lying in small spaces (claustrophobia). If you do, your doctor may give you medicine to help you relax.  What should I do after the exam: No restrictions, You may resume normal activities.  What is this test: Your doctor has ordered a PET scan (positron emission tomography). This will take pictures of the cells and organs in your body. Your doctor may have also ordered a CT scan (computed tomography). This is another way to take pictures of your cells and organs. It is done at the same time as the PET scan. The pictures will help us understand your problem so we can make a treatment plan that fits your needs.  Who should I call with questions: Please call your Imaging Department at your exam site with any questions. Directions, parking instructions, and other information is available on our website, [email or web address].            Nov 12, 2018  1:30 PM CST   Ech Comp Bubble Study W/ Opti with UUECHR2   [facility], [place],  [facility] ([facility])    [address] [facility] [id number] [phone]           1.   Please bring or wear a comfortable two-piece outfit. 2.  You may eat, drink and take your normal medicines. 3.  For any questions that cannot be answered, please contact the ordering physician 4.  Please do not wear perfumes or scented lotions on the day of your exam.            Nov 13, 2018  8:00 AM CST   SIX MINUTE WALK with UC PFL 6 MINUTE WALK 1, UC PFL C   [facility] Pulmonary Function Testing ([facility])    [address]  3rd Floor  [facility] [phone] [phone]            Nov 13, 2018  9:30 AM CST   (Arrive by 9:15 AM)   SOT CARE COORDINATOR EVAL with Montse Zambrano RN   [facility] Solid Organ Transplant ([facility])    [address] [place] [address]  [address]  [facility] [phone] [phone]            Nov 13, 2018 10:00 AM CST   (Arrive by 9:45 AM)   SOT CARE COORDINATOR EVAL with Montse Zambrano RN   [facility] Solid Organ Transplant ([facility])    [address] [place] [address]  [address]  [facility] [id number] [phone]              Who to contact     If you have questions or need follow up information about today's clinic visit or your schedule please contact [facility] SOLID ORGAN TRANSPLANT directly at [phone].  Normal or non-critical lab and imaging results will be communicated to you by MyChart, letter or phone within 4 business days after the clinic has received the results. If you do not hear from us within 7 days, please contact the clinic through MyChart or phone. If you have a critical or abnormal lab result, we will notify you by phone as soon as possible.  Submit refill requests through [employer] or call your pharmacy and they will forward the refill request to us. Please allow 3 business days for your refill to be completed.          Additional Information About Your Visit        Care EveryWhere ID     This is your Care EveryWhere ID. This could be used by other organizations to access your [facility]  "medical records  [id number]        Your Vitals Were     Pulse Temperature Respirations Height Pulse Oximetry BMI (Body Mass Index)    87 98  F (36.7  C) (Oral) 16 1.551 m (5' 1.06\") 95% 23.38 kg/m2       Blood Pressure from Last 3 Encounters:   11/12/18 129/79   07/16/18 110/67    Weight from Last 3 Encounters:   11/12/18 56.2 kg (124 lb)   07/16/18 57.3 kg (126 lb 6.4 oz)   02/05/18 57.2 kg (126 lb)              Today, you had the following     No orders found for display       Primary Care Provider Office Phone # Fax #    Sujatha Mayfield PA-C [phone]       [place] [address]        Equal Access to Services     [facility] : Hadii jean pierre elder Soroma, waaxda luqadaha, qaybta kaalmamahendra wasserman, matt mcnair . So [facility] [phone].    ATENCIÓN: Si habla español, tiene a paula disposición servicios gratuitos de asistencia lingüística. Llame al [phone].    We comply with applicable federal civil rights laws and [place] laws. We do not discriminate on the basis of race, color, national origin, age, disability, sex, sexual orientation, or gender identity.            Thank you!     Thank you for choosing [facility] SOLID ORGAN TRANSPLANT  for your care. Our goal is always to provide you with excellent care. Hearing back from our patients is one way we can continue to improve our services. Please take a few minutes to complete the written survey that you may receive in the mail after your visit with us. Thank you!             Your Updated Medication List - Protect others around you: Learn how to safely use, store and throw away your medicines at www.disposemymeds.org.          This list is accurate as of 11/12/18  8:41 AM.  Always use your most recent med list.                   Brand Name Dispense Instructions for use Diagnosis    * albuterol (2.5 MG/3ML) 0.083% neb solution      TAKE 3 ML IN NEBULIZER EVERY FOUR HOURS AS " NEEDED FOR SHORTNESS OF BREATH        * VENTOLIN  (90 Base) MCG/ACT inhaler   Generic drug:  albuterol           azithromycin 500 MG tablet    ZITHROMAX          budesonide 1 MG/2ML Susp neb solution    PULMICORT     Inhale 1 mg into the lungs        TYLENOL 500 MG tablet   Generic drug:  acetaminophen           * Notice:  This list has 2 medication(s) that are the same as other medications prescribed for you. Read the directions carefully, and ask your doctor or other care provider to review them with you.

## 2018-11-13 ENCOUNTER — PATIENT OUTREACH (OUTPATIENT)
Dept: CARE COORDINATION | Facility: CLINIC | Age: 68
End: 2018-11-13

## 2018-11-13 ENCOUNTER — ALLIED HEALTH/NURSE VISIT (OUTPATIENT)
Dept: TRANSPLANT | Facility: CLINIC | Age: 68
End: 2018-11-13
Attending: INTERNAL MEDICINE
Payer: MEDICARE

## 2018-11-13 DIAGNOSIS — J44.9 COPD (CHRONIC OBSTRUCTIVE PULMONARY DISEASE) (H): ICD-10-CM

## 2018-11-13 DIAGNOSIS — J44.9 COPD (CHRONIC OBSTRUCTIVE PULMONARY DISEASE) (H): Primary | ICD-10-CM

## 2018-11-13 DIAGNOSIS — R06.02 SOB (SHORTNESS OF BREATH): ICD-10-CM

## 2018-11-13 DIAGNOSIS — R93.89 ABNORMAL CT SCAN, CHEST: ICD-10-CM

## 2018-11-13 DIAGNOSIS — Z76.82 ORGAN TRANSPLANT CANDIDATE: ICD-10-CM

## 2018-11-13 DIAGNOSIS — Z76.82 LUNG TRANSPLANT CANDIDATE: ICD-10-CM

## 2018-11-13 DIAGNOSIS — Z76.82 ORGAN TRANSPLANT CANDIDATE: Primary | ICD-10-CM

## 2018-11-13 LAB
6 MIN WALK (FT): 750 FT
6 MIN WALK (M): 229 M
GAMMA INTERFERON BACKGROUND BLD IA-ACNC: 0.05 IU/ML
IGE SERPL-ACNC: <2 KIU/L (ref 0–114)
IGG SERPL-MCNC: 599 MG/DL (ref 695–1620)
IGG1 SER-MCNC: 397 MG/DL (ref 300–856)
IGG2 SER-MCNC: 107 MG/DL (ref 158–761)
IGG3 SER-MCNC: 82 MG/DL (ref 24–192)
IGG4 SER-MCNC: 11 MG/DL (ref 11–86)
M TB IFN-G BLD-IMP: NEGATIVE
M TB IFN-G CD4+ BCKGRND COR BLD-ACNC: >10 IU/ML
MITOGEN IGNF BCKGRD COR BLD-ACNC: 0 IU/ML
MITOGEN IGNF BCKGRD COR BLD-ACNC: 0.01 IU/ML

## 2018-11-13 PROCEDURE — 40000269 ZZH STATISTIC NO CHARGE FACILITY FEE: Mod: ZF

## 2018-11-13 NOTE — MR AVS SNAPSHOT
After Visit Summary   11/13/2018    Chantal Bernal    MRN: [id number]           Patient Information     Date Of Birth          1950        Visit Information        Provider Department      11/13/2018 9:30 AM Montse Zambrano RN [facility] Solid Organ Transplant        Today's Diagnoses     COPD (chronic obstructive pulmonary disease) (H)    -  1       Follow-ups after your visit        Your next 10 appointments already scheduled     Nov 14, 2018  7:00 AM CST   Procedure 1.5 hr with U2A ROOM 16   Unit 2A [facility] ([facility])    [address] [address]               Nov 14, 2018  8:30 AM CST   Cath 90 Minute with UUHCVR5   [facility],  Heart Cath Lab ([facility])    [address] [address]   [phone]            Nov 15, 2018   Procedure with Gama Long MD   [facility] [place], Endoscopy ([facility])    [address] [address] [phone]           The [facility] is located on the corner of [address] and [address] on the [facility]. It is easily accessible from virtually any point in the [place] area, via [address] and [address].            Nov 15, 2018  2:00 PM CST   (Arrive by 1:45 PM)   New Patient Visit with Penny Rodríguez MD   [facility] Heart Care ([facility])    [address]  [address]  [place] [address] [phone]            Nov 16, 2018  8:30 AM CST   Pulmonary Eval with  Pulmonary Rehab 1   [facility] [place], Cardiac Rehabilitation ([facility])    [address]st [address]  [place] [facility] [place] [address] [phone]            Nov 16, 2018 10:00 AM CST   (Arrive by 9:45 AM)   SOT CARE COORDINATOR EVAL with Montse Zambrano RN   [facility]  Solid Organ Transplant ([facility])    [address]  [address]  [place] [address] [phone]            Nov 27, 2018  9:40 AM CST   (Arrive by 9:25 AM)   Return Pre-Transplant with Saud Hoffman MD   [facility] Solid Organ Transplant ([facility])    [address]  3rd Floor  [place] [facility] [address] [phone]            Nov 27, 2018 11:00 AM CST   (Arrive by 10:45 AM)   New Patient Visit with Sameera Middleton MD   [facility] ([facility])    [address]  [address]  [place] [address] [phone]              Who to contact     If you have questions or need follow up information about today's clinic visit or your schedule please contact [facility]GAN TRANSPL[facility] directly at [phone].  Normal or non-critical lab and imaging results will be communicated to you by MyChart, letter or phone within 4 business days after the clinic has received the results. If you do not hear from us within 7 days, please contact the clinic through MyChart or phone. If you have a critical or abnormal lab result, we will notify you by phone as soon as possible.  Submit refill requests through [employer] or call your pharmacy and they will forward the refill request to us. Please allow 3 business days for your refill to be completed.          Additional Information About Your Visit        Care EveryWhere ID     This is your Care EveryWhere ID. This could be used by other organizations to access your [facility] medical records  [id number]         Blood Pressure from Last 3 Encounters:   11/12/18 129/79   07/16/18 110/67    Weight from Last 3 Encounters:   11/12/18 56.2 kg (124 lb)   07/16/18 57.3 kg (126 lb 6.4 oz)   02/05/18 57.2 kg (126 lb)              Today, you had the following     No orders found for display       Primary Care Provider Office Phone # Fax #    Sujatha Mayfield PA-C  [phone]       [facility] [address] [address] [address]        Equal Access to Services     JOSE LUTHER : Hadii jean pierre min jerrod Henderson, montez roctawanda, petar wasserman, matt og. So [facility] [phone].    ATENCIÓN: Si habla español, tiene a paula disposición servicios gratuitos de asistencia lingüística. Llame al [phone].    We comply with applicable federal civil rights laws and [place] laws. We do not discriminate on the basis of race, color, national origin, age, disability, sex, sexual orientation, or gender identity.            Thank you!     Thank you for choosing [facility] SOLID ORGAN TRANSPLANT  for your care. Our goal is always to provide you with excellent care. Hearing back from our patients is one way we can continue to improve our services. Please take a few minutes to complete the written survey that you may receive in the mail after your visit with us. Thank you!             Your Updated Medication List - Protect others around you: Learn how to safely use, store and throw away your medicines at www.disposemymeds.org.          This list is accurate as of 11/13/18  4:31 PM.  Always use your most recent med list.                   Brand Name Dispense Instructions for use Diagnosis    * albuterol (2.5 MG/3ML) 0.083% neb solution      TAKE 3 ML IN NEBULIZER EVERY FOUR HOURS AS NEEDED FOR SHORTNESS OF BREATH        * VENTOLIN  (90 Base) MCG/ACT inhaler   Generic drug:  albuterol           aspirin 81 MG tablet     30 tablet    Take 1 tablet (81 mg) by mouth daily    COPD (chronic obstructive pulmonary disease) (H)       azithromycin 500 MG tablet    ZITHROMAX          budesonide 1 MG/2ML Susp neb solution    PULMICORT     Inhale 1 mg into the lungs        TYLENOL 500 MG tablet   Generic drug:  acetaminophen           * Notice:  This list has 2 medication(s) that are the same as other medications prescribed for you.  Read the directions carefully, and ask your doctor or other care provider to review them with you.

## 2018-11-13 NOTE — PROGRESS NOTES
Outpatient MNT: Lung Transplant Evaluation    Current BMI: 23.4 (HT 61 in,  lbs/56 kg)  BMI is within criteria of <30 for lung transplant     Time Spent: 15 minutes  Visit Type: Initial  Referring Physician: Dr Hoffman   Pt accompanied by: her two daughters    History of previous txp: none    Nutrition Assessment  Pt cooks for self. In addition, her two daughters bring her homemade food a few times/week.     Appetite: baseline/normal     Vitamins, Supplements, Pertinent Meds: vit D 5000 international unit(s)/day for several years  Herbal Medicines/Supplements: none     OF NOTE: pt's vitamin D level came back high @ 102     Diet Recall  Breakfast None    Lunch [place]    Dinner Pizza, Khmer toast, meat/potato/veggie, Swedish meatballs, soup    Snacks Chips, crackers, apple pie    Beverages 1 Coke/day, coffee, tea, water, occasional milk or juice    Alcohol None    Dining out A few times/month      Physical Activity  None      Anthropometrics  Height:   61 in   BMI:    23.4    Weight Status:Normal BMI   Weight:  124 lbs            IBW (lb): 105  % IBW: 118    Wt Hx: Pt reports stable weight     Adj/dosing BW: 124 lbs/56 kg       Frailty Screening   Weakness Meets criteria for frailty if  strength (average of 3 trials, dominant hand) is:    Men  ?29 kg for BMI ?24  ?30 kg for BMI 24.1-26  ?30 kg for BMI 26.1-28  ?32 kg for BMI >28 Women  ?17 kg for BMI ?23  ?17.3 kg for BMI 23.1-26  ?18 kg for BMI 26.1-29  ?21 kg for BMI >29    Equipment: Pancho hand dynamometer  Participant attempts to squeeze the dynamometer maximally 3 times with the dominant hand.   Average of 3 trials: 16 kg with BMI of 23.4  Meets criteria for frailty based on handgrip strength: yes    Labs  Recent Labs   Lab Test  11/12/18   0753   CHOL  231*   HDL  65   LDL  136*   TRIG  146     Lab Results   Component Value Date    A1C 5.3 11/12/2018       Malnutrition  % Intake: No decreased intake noted  % Weight Loss: None noted  Subcutaneous  Fat Loss: None  Muscle Loss: None  Fluid Accumulation/Edema: None noted  Malnutrition Diagnosis: Patient does not meet two of the above criteria necessary for diagnosing malnutrition     Estimated Nutrition Needs  Energy  6119-3244     (25-30 kcal/kg for maintenance)     Protein  45-56    (0.8-1 g/kg for maintenance)         Fluid  1 ml/kcal or per MD     Nutrition Diagnosis  Food and nutrition related knowledge deficit r/t pre lung transplant eval AEB pt verbalized not hearing pre/post transplant diet guidelines.    Nutrition Intervention  Nutrition education provided:  Reviewed pt's current diet. She has no concerns or questions. PO intakes, weight, appetite are status quo. Will f/u with coordinator about high vitamin D level. Would recommend either discontinue use of vit D for some time vs decrease in dosage.     Reviewed post txp diet guidelines in brief (will review in further detail post txp):  (1) Review of proper food safety measures d/t immunosuppressant therapy post-op and increased risk for food-borne illness    (2) Avoid the following post txp d/t risk for rejection, unknown effects on the organs, and/or potential interactions with immunosuppressants:  - Herbal, Chinese, holistic, chiropractic, natural, alternative medicines and supplements  - Detoxes and cleanses  - Weight loss pills  - Protein powders or other products with extracts or herbs (ie green tea extract)    (3) Med regimen and possible side effects    Patient Understanding: Pt verbalized understanding of education provided.  Expected Compliance: Good  Follow-Up Plans: PRN     Nutrition Goals  1. Pt to verbalize understanding of 3 aspects of post txp education provided  2. Reduction in vit D dose vs discontinue so level returns to wnl    Provided pt with contact info.   Mera Hart RD, LD  [facility] [phone]

## 2018-11-13 NOTE — Clinical Note
Hey! Just FYI her vitamin D is high at 102. She is taking 5000 international units daily for the past several years. I would recommend stopping this for a while or decreasing her dose but said I would mention it to the team

## 2018-11-13 NOTE — MR AVS SNAPSHOT
After Visit Summary   11/13/2018    Chantal Bernal    MRN: [id number]           Patient Information     Date Of Birth          1950        Visit Information        Provider Department      11/13/2018 10:00 AM Montse Zambrano RN [facility] Solid Organ Transplant        Today's Diagnoses     COPD (chronic obstructive pulmonary disease) (H)    -  1    SOB (shortness of breath)           Follow-ups after your visit        Your next 10 appointments already scheduled     Nov 13, 2018  2:00 PM CST   (Arrive by 1:45 PM)   [facility] ([facility] [place])    [address]  [address]  [facility] [place] [facility] [address]   [phone]            Nov 14, 2018  7:00 AM CST   Procedure 1.5 hr with U2A ROOM 16   Unit 2A [facility] ([facility])    [address] [address]               Nov 14, 2018  8:30 AM CST   Cath 90 Minute with UUHCVR5   [facility],  Heart Cath Lab ([facility])    [address] [address] [phone]            Nov 15, 2018   Procedure with Gama Long MD   [facility] [place], Endoscopy ([facility])    [address] [address] [phone]           The [facility] is located on the corner of [address] and [address] on the [facility]. It is easily accessible from virtually any point in the [place] [place] [facility] area, via [address] and [address].            Nov 15, 2018  2:00 PM CST   (Arrive by 1:45 PM)   New Patient Visit with Penny Rodríguez MD    [facility] ([facility] [place])    [address]  [address]  [place] [address]   [phone]            Nov 16, 2018  8:30 AM CST   Pulmonary Eval with Ur Pulmonary Rehab 1   [facility] [place], Cardiac  Rehabilitation ([facility])    [address] 1st [address]  [facility] [address]   [phone]            Nov 16, 2018 10:00 AM CST   (Arrive by 9:45 AM)   SOT CARE COORDINATOR EVAL with Montse Zambrano RN   [facility] Solid Organ Transplant ([facility])    [address]  [address]  [facility] [address] [phone]            Nov 27, 2018  9:40 AM CST   (Arrive by 9:25 AM)   Return Pre-Transplant with Saud Hoffman MD   [facility] Solid Organ Transplant ([facility])    [address]  3rd Floor  [facility] [address] [phone]            Nov 27, 2018 11:00 AM CST   (Arrive by 10:45 AM)   New Patient Visit with Sameera Middleton MD   [facility] ([facility])    [address]  [address]  [facility] [address] [phone]              Who to contact     If you have questions or need follow up information about today's clinic visit or your schedule please contact [facility] SOLID ORGAN TRANSPLANT directly at [phone].  Normal or non-critical lab and imaging results will be communicated to you by MyChart, letter or phone within 4 business days after the clinic has received the results. If you do not hear from us within 7 days, please contact the clinic through MyChart or phone. If you have a critical or abnormal lab result, we will notify you by phone as soon as possible.  Submit refill requests through [employer] or call your pharmacy and they will forward the refill request to us. Please allow 3 business days for your refill to be completed.          Additional Information About Your Visit        Care EveryWhere ID     This is your Care EveryWhere ID. This could be used by other organizations to access your [facility] medical records  [id number]         Blood Pressure from Last 3 Encounters:   11/12/18  129/79   07/16/18 110/67    Weight from Last 3 Encounters:   11/12/18 56.2 kg (124 lb)   07/16/18 57.3 kg (126 lb 6.4 oz)   02/05/18 57.2 kg (126 lb)              Today, you had the following     No orders found for display       Primary Care Provider Office Phone # Fax #    Sujatha Mayfield PA-C [phone]       [place] [address]        Equal Access to Services     JOSE LUTHER : Hadii aad ku hadasho Soomaali, waaxda luqadaha, qaybta kaalmada adeegyada, waxay enein hayjimn kristan mcnair . So [facility] [phone].    ATENCIÓN: Si habla español, tiene a paula disposición servicios gratuitos de asistencia lingüística. [facility] [phone].    We comply with applicable federal civil rights laws and [place] laws. We do not discriminate on the basis of race, color, national origin, age, disability, sex, sexual orientation, or gender identity.            Thank you!     Thank you for choosing [facility] SOLID ORGAN TRANSPLANT  for your care. Our goal is always to provide you with excellent care. Hearing back from our patients is one way we can continue to improve our services. Please take a few minutes to complete the written survey that you may receive in the mail after your visit with us. Thank you!             Your Updated Medication List - Protect others around you: Learn how to safely use, store and throw away your medicines at www.disposemymeds.org.          This list is accurate as of 11/13/18  1:37 PM.  Always use your most recent med list.                   Brand Name Dispense Instructions for use Diagnosis    * albuterol (2.5 MG/3ML) 0.083% neb solution      TAKE 3 ML IN NEBULIZER EVERY FOUR HOURS AS NEEDED FOR SHORTNESS OF BREATH        * VENTOLIN  (90 Base) MCG/ACT inhaler   Generic drug:  albuterol           aspirin 81 MG tablet     30 tablet    Take 1 tablet (81 mg) by mouth daily    COPD (chronic obstructive pulmonary disease) (H)        azithromycin 500 MG tablet    ZITHROMAX          budesonide 1 MG/2ML Susp neb solution    PULMICORT     Inhale 1 mg into the lungs        TYLENOL 500 MG tablet   Generic drug:  acetaminophen           * Notice:  This list has 2 medication(s) that are the same as other medications prescribed for you. Read the directions carefully, and ask your doctor or other care provider to review them with you.

## 2018-11-13 NOTE — NURSING NOTE
Patient reports she has been taking ASA 81mg daily. I have updated MR.  Also instructed patient to continue to take the 81mg daily and will not discontinue for cardiac procedures on 11-14-18.  Patient is in agreement with plan of care.

## 2018-11-13 NOTE — NURSING NOTE
"Patient with   Isabella (both daughters) came to the pre lung class, content per [employer] videos. They were attentive, stated understanding, and asked good questions. They were given all relevant handouts.   Verified that patient has received the following items:      \"Questions and Answers for Transplant Candidates and Families about Multiple Listing Waiting Time Transfer\"       One-Year Survival Rates for Heart and Lung Transplant  for [facility].      Reviewed the following documents with the patient:      \"Guidelines for Being on the Transplant List\".      \" What You Need to Know about a Lung and Heart-Lung Transplant .      Provided Thoracic Transplant Patient Handbook.     Required signatures obtained and forms are scanned to EMR.  Addressed patient questions and concerns regarding transplant.    Discussed donor offers including increased risk, and DCD donors.     Discussed physician and coordinator management pre to post lung transplant.     HLA results pending.  Discussed PRA monitoring with patient.     Will review with Lung Transplant Team for transplant candidacy when evaluation complete.      Pt and family were encouraged to create login/password for [employer] to continue to view videos to reinforce education.      Patient received form for information for donor family at time of donation/transplant.  Will collect during closure visit.    "

## 2018-11-13 NOTE — MR AVS SNAPSHOT
After Visit Summary   11/13/2018    Chantal Bernal    MRN: [id number]           Patient Information     Date Of Birth          1950        Visit Information        Provider Department      11/13/2018 2:00 PM Mor Shore LICSW [facility]        Today's Diagnoses     COPD (chronic obstructive pulmonary disease) (H)    -  1    Lung transplant candidate           Follow-ups after your visit        Your next 10 appointments already scheduled     Nov 15, 2018   Procedure with Gama Long MD   [facility], [place], Endoscopy ([facility])    [address] [address]   [phone]           The [facility] is located on the corner of [address] and [address] on the [facility]. It is easily accessible from virtually any point in the [place] area, via [address] and [address].            Nov 15, 2018  2:00 PM CST   (Arrive by 1:45 PM)   New Patient Visit with Penny Rodríguez MD   [facility] ([facility])    [address] [place] [address]  [address]  [facility] [address]   [phone]            Nov 16, 2018  8:30 AM CST   Pulmonary Eval with  Pulmonary Rehab 1   [facility] [place], Cardiac Rehabilitation ([facility])    [address] 1st [address]  [facility] [address]   [phone]            Nov 16, 2018 10:00 AM CST   (Arrive by 9:45 AM)   SOT CARE COORDINATOR EVAL with Montse Zambrano RN   [facility] ([facility])    [address]  [address]  [facility] [address]   [phone]            Nov 27, 2018  9:40 AM CST   (Arrive by 9:25 AM)   Return Pre-Transplant with Saud Hoffman MD   [facility] ([facility])    [address] [place] [address]  3rd Floor  [facility]  [id number] [phone]            Nov 27, 2018 11:00 AM CST   (Arrive by 10:45 AM)   New Patient Visit with Sameera Middleton MD   [facility] ([facility] [place])    [address]  [address]  [facility] [id number] [phone]              Who to contact     If you have questions or need follow up information about today's clinic visit or your schedule please contact [facility] SOL[facility] ORGAN TRANSPL[facility] directly at [phone].  Normal or non-critical lab and imaging results will be communicated to you by MyChart, letter or phone within 4 business days after the clinic has received the results. If you do not hear from us within 7 days, please contact the clinic through MyChart or phone. If you have a critical or abnormal lab result, we will notify you by phone as soon as possible.  Submit refill requests through [employer] or call your pharmacy and they will forward the refill request to us. Please allow 3 business days for your refill to be completed.          Additional Information About Your Visit        Care EveryWhere ID     This is your Care EveryWhere ID. This could be used by other organizations to access your [facility] medical records  [id number]         Blood Pressure from Last 3 Encounters:   11/12/18 129/79   07/16/18 110/67    Weight from Last 3 Encounters:   11/12/18 56.2 kg (124 lb)   07/16/18 57.3 kg (126 lb 6.4 oz)   02/05/18 57.2 kg (126 lb)              Today, you had the following     No orders found for display       Primary Care Provider Office Phone # Fax #    Sujatha Mayfield PA-C [phone]       [place] [address]        Equal Access to Services     ROSEMARIE LUTHER : Hadbob Henderson, wakatieda luqadaha, qaybta kaalmada arikda, matt og. So [facility] [phone].    ATENCIÓN: Si habla español, tiene a paula disposición servicios gratuitos de asistencia  lingüísticaHenrik Ortiz al [phone].    We comply with applicable federal civil rights laws and [place] laws. We do not discriminate on the basis of race, color, national origin, age, disability, sex, sexual orientation, or gender identity.            Thank you!     Thank you for choosing [facility] SOLID ORGAN TRANSPLANT  for your care. Our goal is always to provide you with excellent care. Hearing back from our patients is one way we can continue to improve our services. Please take a few minutes to complete the written survey that you may receive in the mail after your visit with us. Thank you!             Your Updated Medication List - Protect others around you: Learn how to safely use, store and throw away your medicines at www.disposemymeds.org.          This list is accurate as of 11/13/18 11:59 PM.  Always use your most recent med list.                   Brand Name Dispense Instructions for use Diagnosis    * albuterol (2.5 MG/3ML) 0.083% neb solution      TAKE 3 ML IN NEBULIZER EVERY FOUR HOURS AS NEEDED FOR SHORTNESS OF BREATH        * VENTOLIN  (90 Base) MCG/ACT inhaler   Generic drug:  albuterol           aspirin 81 MG tablet     30 tablet    Take 1 tablet (81 mg) by mouth daily    COPD (chronic obstructive pulmonary disease) (H)       azithromycin 500 MG tablet    ZITHROMAX          budesonide 1 MG/2ML Susp neb solution    PULMICORT     Inhale 1 mg into the lungs        TYLENOL 500 MG tablet   Generic drug:  acetaminophen           * Notice:  This list has 2 medication(s) that are the same as other medications prescribed for you. Read the directions carefully, and ask your doctor or other care provider to review them with you.

## 2018-11-13 NOTE — NURSING NOTE
Met with patient,  Bhavna (daughter), and Marcella (daughter) to discuss the coronary angiogram/right heart cath scheduled for 11-14-18. Will check in at 0700 to [facility] Waiting Room.  Reviewed rationale,  procedure and post care. Provided Coronary Angiogram booklet/Right heart cath booklet.  Instructed pt to take 81 mg ASA the night before and morning of procedure, per Heart Cath Lab protocol.  K+ 3.8 (WNL).  Instructed patient not to eat or drink after midnight, but could take meds in AM with sips clear liquids.  Marcella (daughter) will be available to drive and care for patient after procedure.

## 2018-11-13 NOTE — MR AVS SNAPSHOT
After Visit Summary   11/13/2018    Chantal Bernal    MRN: [id number]           Patient Information     Date Of Birth          1950        Visit Information        Provider Department      11/13/2018 1:00 PM Viviana Hart RD [facility]        Today's Diagnoses     Organ transplant candidate    -  1       Follow-ups after your visit        Your next 10 appointments already scheduled     Nov 13, 2018  2:00 PM CST   (Arrive by 1:45 PM)   SOT SOCIAL WORK EVAL with JALIL Ackerman   [facility] ([facility] [place])    [address]  [address]  [facility] [address]   [phone]            Nov 14, 2018  7:00 AM CST   Procedure 1.5 hr with U2A ROOM 16   Unit [address]A [facility] ([facility])    [address] [address]               Nov 14, 2018  8:30 AM CST   Cath 90 Minute with UUHCVR5   [facility],  Heart Cath Lab ([facility])    [address] [address] [phone]            Nov 15, 2018   Procedure with Gama Long MD   [facility] [place], Endoscopy ([facility])    [address] [facility] [address] [phone]           The [facility] is located on the corner of [address] and [address] on the [facility]. It is easily accessible from virtually any point [place] the [place] area, via [address] and [address].            Nov 15, 2018  2:00 PM CST   (Arrive by 1:45 PM)   New Patient Visit with Penny Rodríguez MD   [facility] ([facility])    [address]  [address]  [facility] [address] [phone]            Nov 16, 2018  8:30 AM CST   Pulmonary Eval with  Pulmonary Rehab 1   [facility] [place], Cardiac Rehabilitation ([facility])    [address] 1st [address]  [facility] [address]   [phone]            Nov 16, 2018 10:00 AM CST   (Arrive by 9:45 AM)   SOT CARE COORDINATOR EVAL with Montse Zambrano RN   [facility] Solid Organ Transplant ([facility])    [address]  [address]  [facility] [address]   [phone]            Nov 27, 2018  9:40 AM CST   (Arrive by 9:25 AM)   Return Pre-Transplant with Saud Hoffman MD   [facility] Solid Organ Transplant ([facility])    [address]  3rd Floor  [facility] [address]   [phone]            Nov 27, 2018 11:00 AM CST   (Arrive by 10:45 AM)   New Patient Visit with Sameera Middleton MD   [facility] ([facility])    [address]  [address]  [facility] [address] [phone]              Who to contact     If you have questions or need follow up information about today's clinic visit or your schedule please contact [facility] SOLID ORGAN TRANSPLANT directly at [phone].  Normal or non-critical lab and imaging results will be communicated to you by MyChart, letter or phone within 4 business days after the clinic has received the results. If you do not hear from us within 7 days, please contact the clinic through MyChart or phone. If you have a critical or abnormal lab result, we will notify you by phone as soon as possible.  Submit refill requests through [employer] or call your pharmacy and they will forward the refill request to us. Please allow 3 business days for your refill to be completed.          Additional Information About Your Visit        Care EveryWhere ID     This is your Care EveryWhere ID. This could be used by other organizations to access your [facility] medical records  [id number]         Blood Pressure from Last 3 Encounters:   11/12/18 129/79   07/16/18 110/67    Weight from Last 3  Encounters:   11/12/18 56.2 kg (124 lb)   07/16/18 57.3 kg (126 lb 6.4 oz)   02/05/18 57.2 kg (126 lb)              Today, you had the following     No orders found for display       Primary Care Provider Office Phone # Fax #    Sujatha Mayfield PA-C [phone]       [place] [address]        Equal Access to Services     JOSE LUTHER : Hadii aad ku hadasho Soomaali, waaxda luqadaha, qaybta kaalmada adeegyada, waxay idiin hayaan adeeg riaarajonathan lakathryn . So [facility] [phone].    ATENCIÓN: Si habla español, tiene a paula disposición servicios gratuitos de asistencia lingüística. [facility] [phone].    We comply with applicable federal civil rights laws and [place] laws. We do not discriminate on the basis of race, color, national origin, age, disability, sex, sexual orientation, or gender identity.            Thank you!     Thank you for choosing [facility] SOLID ORGAN TRANSPLANT  for your care. Our goal is always to provide you with excellent care. Hearing back from our patients is one way we can continue to improve our services. Please take a few minutes to complete the written survey that you may receive in the mail after your visit with us. Thank you!             Your Updated Medication List - Protect others around you: Learn how to safely use, store and throw away your medicines at www.disposemymeds.org.          This list is accurate as of 11/13/18  1:53 PM.  Always use your most recent med list.                   Brand Name Dispense Instructions for use Diagnosis    * albuterol (2.5 MG/3ML) 0.083% neb solution      TAKE 3 ML IN NEBULIZER EVERY FOUR HOURS AS NEEDED FOR SHORTNESS OF BREATH        * VENTOLIN  (90 Base) MCG/ACT inhaler   Generic drug:  albuterol           aspirin 81 MG tablet     30 tablet    Take 1 tablet (81 mg) by mouth daily    COPD (chronic obstructive pulmonary disease) (H)       azithromycin 500 MG tablet    ZITHROMAX           budesonide 1 MG/2ML Susp neb solution    PULMICORT     Inhale 1 mg into the lungs        TYLENOL 500 MG tablet   Generic drug:  acetaminophen           * Notice:  This list has 2 medication(s) that are the same as other medications prescribed for you. Read the directions carefully, and ask your doctor or other care provider to review them with you.

## 2018-11-13 NOTE — NURSING NOTE
Patient has decreased GFR (41). Review of labs via Care Everywhere indicate chronic low GFR less than 50 with elevated Cr. Patient also has possible UTI based on lab UA/Micro. Left voicemail with cath lab RN Lucille Vo alerting her to possible cancellation of testing to be held tomorrow 11/13/2018. Transplant pulmonologist alert x 2 awaiting response. Patient called and left message regarding concerns. Plan made to follow up with patient later today, left voice mail with transplant coordinators contact information.

## 2018-11-14 ENCOUNTER — TELEPHONE (OUTPATIENT)
Dept: TRANSPLANT | Facility: CLINIC | Age: 68
End: 2018-11-14

## 2018-11-14 DIAGNOSIS — N18.30 CKD (CHRONIC KIDNEY DISEASE) STAGE 3, GFR 30-59 ML/MIN (H): Primary | ICD-10-CM

## 2018-11-14 DIAGNOSIS — J44.9 COPD (CHRONIC OBSTRUCTIVE PULMONARY DISEASE) (H): ICD-10-CM

## 2018-11-14 DIAGNOSIS — Z76.82 LUNG TRANSPLANT CANDIDATE: ICD-10-CM

## 2018-11-14 LAB
BACTERIA SPEC CULT: ABNORMAL
Lab: ABNORMAL
SPECIMEN SOURCE: ABNORMAL

## 2018-11-14 RX ORDER — LIDOCAINE 40 MG/G
CREAM TOPICAL
Status: CANCELLED | OUTPATIENT
Start: 2018-11-14

## 2018-11-14 RX ORDER — SODIUM CHLORIDE 9 MG/ML
INJECTION, SOLUTION INTRAVENOUS CONTINUOUS
Status: CANCELLED | OUTPATIENT
Start: 2018-11-14

## 2018-11-14 NOTE — TELEPHONE ENCOUNTER
Called patient's daughter to discuss recent lab findings and next steps regarding lung transplant evaluation process. Patient GFR and Cr outside of norm and concerning. Discussed patient could potential not be a candidate for lung transplant if her GFR remains low. Patient also has possible UTI which should be treated. Patient and family requesting patient be seen by nephrologist at [facility]. Plan made with patient and family to try and schedule appointment while patent is here but if not patient is ok returning for appointment. Contacted MD with micro susceptibilities for abx Rx. Waiting call back. Will discuss patient's case in Lung Transplant Committee meeting.

## 2018-11-14 NOTE — PROGRESS NOTES
Patient of Dr. Hoffman seen in clinic for psychosocial assessment.   45 minutes spent with patient. 100% of visit consisted of counseling related to issues surrounding COPD and lung transplant.    Psychosocial Assessment   Name: Chantal Bernal     MRN:  [id number]        Patient Name / Age / Race: Chantal Bernal/ 67 year old/    Source of Information: Patient and 2 daughters, Marcella and Bhavna   : Mor Shore, [facility]   Interview Date: November 13, 2018   Reason for Referral: Lung Transplant     Current Living Situation   Location ([facility] [address] [place] [address] [address]  [facility] [address]   With Whom: Adult son, Diomedes (40).  His wife, Myesha, and their children Soham (19) Bc (13) and Davis (6)   Type of Home: single family, one story with basement.  Patient owns house.    Working Phone? Yes    Three Pronged Outlet? n/a   Distance from Hospital: 2 1/2 hours   Need to Relocate Post Surgery? Yes    Discussed? 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     Education: High school + some college   [place]? No   Income: salary/wages and SS MCC   Insurance: Medicare and Private Insurance   Name of Private Insurance: Currently has BC/BS complete.  Switches to a C[employer] medicare supplement on January 1st.     Medication Coverage: Medicare Part D policy    In current situation, pt. can afford medication and supply costs:  Yes    Other Financial Concerns/Issues: Limited income.  But 'makes ends meet.' discussed in detail cost of lodging, medications, etc.       Family/Social Support   Marital Status:    Partner Name: n/a   Length of Time : n/a   Partner s Employment: n/a   Relationship: n/a   Children: 5 adult children.  Paco (50) lives in [place], Marcella (47) lives in [place], Bhavna (45) lives in [place], Luis (43) lives in [place] Diomedes (40) lives in with patient near [place].      Parents:    Siblings: 12 siblings.  All but one still alive.  All live [place].     Other Family or Friends Close by: n/a   Support System: available, helpful   Primary Support Person: daughters   Issues: Bhavna Naranjo, and Paco, will share in care.  Provided with caregiver information sheet and caregiver agreement.       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     Daily Activities: Light housekeeping.  Cares for severely disabled 13 year old grandson.  This involves changing him and doing personal cares.  Can lift him from bed to chair.  Doesn't usually wear O2.  Only when she gets extremely short of breath.     Transportation: self      Medical Status   Patient s understanding of need for surgical intervention: Hoping lung transplant will improve her quality of life. Yet, patient still quite functional.  Long discussion with patient and family about risks of transplants and difficulty with recovery, etc.  Risk that transplant will not improve quality of life.     Advanced Directive? No    Details: provided with copy.  patient is not interested in completing.  Would want all her children to be decision makers.  Does not wish to discuss or complete document despite encouragement.      Adherence History: Does not wear oxygen as prescribed.     Ability to Adhere to Complex Medical Regime: Discussed need for very rigid adherence following transplant and risk of not being adherent.  Patient verbalized understanding.       Behavioral   Chemical Dependency Issues: No 1-2 wine coolers per year.   Discussed need for abstinence post transplant. Patient agreeable. Does not think this will be problematic.        Smoker? No  Quit 5 years ago.  Cold turkey.  No relapses.  No nicotine products.  Several children smoke, sometimes around her.     Psychiatric impairment: Yes , situational depression and anxiety.  On medication.  Well  controlled.     Coping Style/Strategies: Stays busy.    Recent Legal History: No   Teaching Completed During Assessment Related To Transplant: 1. Housing and relocation needs post surgery.  2. Caregiver needs post surgery.  3. Financial issues related to surgery.  4. Risks of alcohol use post surgery.  5. Common psychosocial stressors pre/post surgery.  6. Support group availability.   Psychosocial Risks Reviewed Related To Transplant: 1. Increased stress related to your emotional, family, social, employment, or financial situation.  2. Affect on work and/or disability benefits.  3. Affect on future health and life insurance.  4. Outcome expectations may not be met.  5. Mental Health risks: anxiety, depression, PTSD, guilt, grief and chronic fatigue.     Observed Behavior   Well informed? No Angry? No   Process info well? Yes  Hostile? No   Evasive? No Oriented X3? Yes    Cautious/Suspicious? Yes  Motivated? Yes    Appropriate Behavior? Yes  Depressed? No   Appropriate Affect? Yes  Anxious? No   Interview Observations: Reluctant to share much info.  Quiet.  Listens, but doesn't engage in much discussion.  Not clear that she understands all risks and difficulties of transplant and that it might not improve quality of life.       Selection Criteria Met   Plan for Support Yes    Chemical Dependence Yes    Smoking Yes    Mental Health Yes    Adequate Finances Yes      Risk Issues:    1. Non-adherent with O2.      Final Evaluation/Assessment:     Patient is still very functional.  Works almost full-time caring for disabled grandson.  Doesn't wear oxygen and yet is able to stay fairly busy.  Concerned that lung transplant may not improve quality of her life. Otherwise, no real psychosocial contraindications to transplant identified.  No financial or insurance concerns.  No CD or psychiatric issues.  She has excellent support from family.      Patient understands risks and benefits of Lung Transplant: not sure she has good  understanding of risks/benefits.      Recommendation:    Conditional -  Will it improve her quality of life?      Signature: Mor Shore     Title: Licensed Independent Clinical                Interview Date: November 13, 2018

## 2018-11-14 NOTE — TELEPHONE ENCOUNTER
Patient Call: General  Route to LPN    Reason for call: Pts daughter is following up regarding lab work and missing appts from yesterday. Please return call when available    Call back needed? Yes    Return Call Needed  Same as documented in contacts section  When to return call?: Greater than one day: Route standard priority

## 2018-11-15 ENCOUNTER — RADIANT APPOINTMENT (OUTPATIENT)
Dept: ULTRASOUND IMAGING | Facility: CLINIC | Age: 68
End: 2018-11-15
Attending: INTERNAL MEDICINE
Payer: MEDICARE

## 2018-11-15 ENCOUNTER — OFFICE VISIT (OUTPATIENT)
Dept: TRANSPLANT | Facility: CLINIC | Age: 68
End: 2018-11-15
Attending: INTERNAL MEDICINE
Payer: MEDICARE

## 2018-11-15 VITALS
HEART RATE: 111 BPM | HEIGHT: 61 IN | DIASTOLIC BLOOD PRESSURE: 78 MMHG | WEIGHT: 125.3 LBS | BODY MASS INDEX: 23.66 KG/M2 | TEMPERATURE: 98.4 F | SYSTOLIC BLOOD PRESSURE: 122 MMHG | RESPIRATION RATE: 18 BRPM

## 2018-11-15 DIAGNOSIS — N39.0 URINARY TRACT INFECTION: Primary | ICD-10-CM

## 2018-11-15 DIAGNOSIS — D64.89 ANEMIA DUE TO OTHER CAUSE, NOT CLASSIFIED: ICD-10-CM

## 2018-11-15 DIAGNOSIS — N18.30 CKD (CHRONIC KIDNEY DISEASE) STAGE 3, GFR 30-59 ML/MIN (H): ICD-10-CM

## 2018-11-15 DIAGNOSIS — R79.89 ELEVATED SERUM CREATININE: ICD-10-CM

## 2018-11-15 DIAGNOSIS — R91.8 PULMONARY NODULES: ICD-10-CM

## 2018-11-15 DIAGNOSIS — N28.1 RENAL CYST: ICD-10-CM

## 2018-11-15 DIAGNOSIS — E67.3 HYPERVITAMINOSIS D: ICD-10-CM

## 2018-11-15 DIAGNOSIS — J44.9 COPD (CHRONIC OBSTRUCTIVE PULMONARY DISEASE) (H): ICD-10-CM

## 2018-11-15 DIAGNOSIS — E83.51 HYPOCALCEMIA: ICD-10-CM

## 2018-11-15 DIAGNOSIS — R79.89 ELEVATED SERUM CREATININE: Primary | ICD-10-CM

## 2018-11-15 DIAGNOSIS — M81.0 OSTEOPOROSIS, UNSPECIFIED OSTEOPOROSIS TYPE, UNSPECIFIED PATHOLOGICAL FRACTURE PRESENCE: ICD-10-CM

## 2018-11-15 DIAGNOSIS — J44.9 CHRONIC OBSTRUCTIVE PULMONARY DISEASE, UNSPECIFIED COPD TYPE (H): ICD-10-CM

## 2018-11-15 DIAGNOSIS — Z76.82 LUNG TRANSPLANT CANDIDATE: ICD-10-CM

## 2018-11-15 LAB
A* LOCUS: NORMAL
A*: NORMAL
ABTEST METHOD: NORMAL
ALBUMIN UR-MCNC: NEGATIVE MG/DL
APPEARANCE UR: CLEAR
B* LOCUS: NORMAL
B*: NORMAL
BILIRUB UR QL STRIP: NEGATIVE
BW-1: NORMAL
BW-2: NORMAL
C* LOCUS: NORMAL
C*: NORMAL
COLOR UR AUTO: ABNORMAL
CREAT UR-MCNC: 37 MG/DL
DPA1* NMDP: NORMAL
DPA1*: NORMAL
DPB1* LOCUS NMDP: NORMAL
DPB1* NMDP: NORMAL
DPB1*: NORMAL
DPB1*LOCUS: NORMAL
DQA1*LOCUS: NORMAL
DQB1* LOCUS: NORMAL
DRB1* LOCUS: NORMAL
DRB1*: NORMAL
DRB5* LOCUS: NORMAL
DRSSO TEST METHOD: NORMAL
GLUCOSE UR STRIP-MCNC: NEGATIVE MG/DL
HGB UR QL STRIP: NEGATIVE
HYALINE CASTS #/AREA URNS LPF: 3 /LPF (ref 0–2)
KETONES UR STRIP-MCNC: NEGATIVE MG/DL
LEUKOCYTE ESTERASE UR QL STRIP: NEGATIVE
MICROALBUMIN UR-MCNC: 6 MG/L
MICROALBUMIN/CREAT UR: 16.06 MG/G CR (ref 0–25)
MUCOUS THREADS #/AREA URNS LPF: PRESENT /LPF
NITRATE UR QL: NEGATIVE
PH UR STRIP: 6 PH (ref 5–7)
PROT UR-MCNC: 0.05 G/L
PROT/CREAT 24H UR: 0.14 G/G CR (ref 0–0.2)
PROTOCOL CUTOFF: NORMAL
RBC #/AREA URNS AUTO: <1 /HPF (ref 0–2)
SA1 CELL: NORMAL
SA1 COMMENTS: NORMAL
SA1 HI RISK ABY: NORMAL
SA1 MOD RISK ABY: NORMAL
SA1 TEST METHOD: NORMAL
SA2 CELL: NORMAL
SA2 COMMENTS: NORMAL
SA2 HI RISK ABY UA: NORMAL
SA2 MOD RISK ABY: NORMAL
SA2 TEST METHOD: NORMAL
SOURCE: ABNORMAL
SP GR UR STRIP: 1.01 (ref 1–1.03)
SQUAMOUS #/AREA URNS AUTO: <1 /HPF (ref 0–1)
UNACCEPTABLE ANTIGEN: NORMAL
UNOS CPRA: 94
UROBILINOGEN UR STRIP-MCNC: 0 MG/DL (ref 0–2)
WBC #/AREA URNS AUTO: 2 /HPF (ref 0–5)

## 2018-11-15 PROCEDURE — 82043 UR ALBUMIN QUANTITATIVE: CPT | Performed by: INTERNAL MEDICINE

## 2018-11-15 PROCEDURE — 81001 URINALYSIS AUTO W/SCOPE: CPT | Performed by: INTERNAL MEDICINE

## 2018-11-15 PROCEDURE — 36415 COLL VENOUS BLD VENIPUNCTURE: CPT | Performed by: INTERNAL MEDICINE

## 2018-11-15 PROCEDURE — 83883 ASSAY NEPHELOMETRY NOT SPEC: CPT | Performed by: INTERNAL MEDICINE

## 2018-11-15 PROCEDURE — 84156 ASSAY OF PROTEIN URINE: CPT | Performed by: INTERNAL MEDICINE

## 2018-11-15 PROCEDURE — 00000402 ZZHCL STATISTIC TOTAL PROTEIN: Performed by: INTERNAL MEDICINE

## 2018-11-15 PROCEDURE — G0463 HOSPITAL OUTPT CLINIC VISIT: HCPCS | Mod: ZF

## 2018-11-15 PROCEDURE — 84165 PROTEIN E-PHORESIS SERUM: CPT | Performed by: INTERNAL MEDICINE

## 2018-11-15 RX ORDER — LORAZEPAM 0.5 MG/1
TABLET ORAL PRN
COMMUNITY
Start: 2018-06-11

## 2018-11-15 RX ORDER — LEVOFLOXACIN 250 MG/1
250 TABLET, FILM COATED ORAL DAILY
Qty: 5 TABLET | Refills: 0 | Status: SHIPPED | OUTPATIENT
Start: 2018-11-15 | End: 2018-11-20

## 2018-11-15 RX ORDER — BUPROPION HYDROCHLORIDE 300 MG/1
300 TABLET ORAL
COMMUNITY
Start: 2018-07-23

## 2018-11-15 RX ORDER — LOSARTAN POTASSIUM 50 MG/1
50 TABLET ORAL
COMMUNITY
Start: 2018-06-22

## 2018-11-15 RX ORDER — MORPHINE SULFATE 15 MG/1
TABLET ORAL PRN
COMMUNITY
Start: 2018-01-31

## 2018-11-15 RX ORDER — METOPROLOL SUCCINATE 50 MG/1
TABLET, EXTENDED RELEASE ORAL
COMMUNITY
Start: 2017-11-27

## 2018-11-15 RX ORDER — PREDNISONE 5 MG/1
40 TABLET ORAL DAILY
COMMUNITY
Start: 2018-07-24

## 2018-11-15 RX ORDER — FUROSEMIDE 20 MG
20 TABLET ORAL
COMMUNITY

## 2018-11-15 RX ORDER — MONTELUKAST SODIUM 10 MG/1
10 TABLET ORAL
COMMUNITY
Start: 2018-07-23

## 2018-11-15 ASSESSMENT — PAIN SCALES - GENERAL: PAINLEVEL: NO PAIN (0)

## 2018-11-15 NOTE — PROGRESS NOTES
Transplant Nephrology Initial Consult  November 15, 2018      Chantal Bernal MRN:[id number] YOB: 1950  Date of Admission:(Not on file)  Primary care provider: Sujatha Mayfield    ASSESSMENT AND RECOMMENDATIONS:   1. Elevated creatinine: I have asked for the records for her labs in the past to be able to compare the creatinine from the November 12 draw in our system.  If the creatinine has been elevated in the past it is likely that the patient does have  Moderate chronic kidney disease stage IIIb.  The underlying etiology of the patient's chronic kidney disease would be unclear.  She is on antihypertensive therapy currently but this was mainly for her heart and had not formally been told that she was hypertensive in the past.  Blood pressure today is beautifully controlled at 122/78.  She has a urinalysis that is dipstick negative for albuminuria.  I will further quantitate her proteinuria status with a urine protein to creatinine ratio as well as a urine albumin to creatinine ratio.  Given her family history I will pursue a renal ultrasound to evaluate for any structural abnormalities.  Last, given her anemia I will check a serum protein electrophoresis as well as serum free light chain to rule out paraproteinemia as the underlying cause of the creatinine elevation and anemia.    However, it is entirely possible that the patient just has lower kidney function in the average patient in her age category.  If she is non-proteinuric and has blood pressure that is well controlled she will lose on average 1 mL/min/year.  She does not need to be considered for kidney transplant at this time.  I told her that if she pursues lung transplantation of course she would be at increased risk of acute kidney injury as well as nephrotoxicity due to some of the immunosuppression medicines.  However this is not a contraindication to her proceeding with evaluation for lung transplant.    I spent 65 minutes with this  patient of which greater than 35 minutes was spent face-to-face counseling regarding the etiology workup and management of her increased creatinine.  All questions were answered.    2. HYPERvitaminosis D: not on vitamin d currently. Monitor.     3. HYPOcalcemia: corrects to 9.4 mg / dl for degree of hypoalbuminemia.    4. COPD with co2 retention: per pulmonary txp team.    5. Anemia: mild anemia at 11.4 g / dl today. MCV 98.    6. Leukocyturia with squamous cells noted on UA: no symptoms of UTI today with likely contamination. Urine culture shows enterococcus at 10-50,000 enterococcus that is pan-sensitive.    7. Osteoporosis: ok for bisphosphonate with this kidney function vs. prolia per PCP    8. Renal cyst: renal imaging with ultrasound to categorize this    9. Pulm nodules: per pulm tx.     REASON FOR CONSULT: Elevated creatinine    HISTORY OF PRESENT ILLNESS:  Chantal Bernal is a 67 year old with hx of COPD being considered for lung transplantation here at the [facility] who was referred for elevated creatinine / evaluation for CKD.    The patient is a 67-year-old female with history of severe COPD with multiple exacerbations that was sent to us to be evaluated for a lung transplant.  During the pre-evaluation laboratory results, it was found that the patient's creatinine is 1.3 mg/dL.  The patient has no available creatinines for me to evaluate in the past.  However, the patient has not been told that she had any chronic kidney disease prior to these labs.    In terms of her past medical history the patient has no history of severe hypertension.  Albeit, the patient is on metoprolol and losartan at this time.    She has never been a large user of nonsteroidal anti-inflammatory drugs.    She uses no supplements over-the-counter.    She has no known family history of chronic kidney disease.  However, she does have a father that was born with congenital agenesis of the kidney.    Today, the patient  feels reasonably well.  She is at her baseline in terms of her breathing.  Her last exacerbation of COPD that necessitated hospitalization was in June of this year.    She denies any chest pain or breathing difficulties.  She has no nausea or vomiting.  She has no fever shakes or chills.  She has no constipation or diarrhea.    From a medicine perspective, the patient has been on losartan 50 mg daily for the last year per her report.  She also notes that she has been intermittently placed on furosemide in the past.    PAST MEDICAL HISTORY:  Reviewed with patient on 11/15/2018     Past Medical History:   Diagnosis Date     Anemia      COPD, severe (H)      Elevated serum creatinine      Hypervitaminosis D      Osteoporosis      Osteoporosis      Pulmonary nodules      Renal cyst      Past Surgical History:   Procedure Laterality Date     CHOLECYSTECTOMY       OTHER SURGICAL HISTORY      Stated had a hysterectomy at [facility] around 2012.      MEDICATIONS:  Current meds  Prior to Admission medications    Medication Sig Last Dose Taking? Auth Provider   acetaminophen (TYLENOL) 500 MG tablet    Reported, Patient   albuterol (2.5 MG/3ML) 0.083% neb solution TAKE 3 ML IN NEBULIZER EVERY FOUR HOURS AS NEEDED FOR SHORTNESS OF BREATH   Reported, Patient   aspirin 81 MG tablet Take 1 tablet (81 mg) by mouth daily   Saud Hoffman MD   azithromycin (ZITHROMAX) 500 MG tablet    Reported, Patient   budesonide (PULMICORT) 1 MG/2ML SUSP neb solution Inhale 1 mg into the lungs   Reported, Patient   VENTOLIN  (90 Base) MCG/ACT Inhaler    Reported, Patient      ALLERGIES:    Allergies   Allergen Reactions     Arformoterol Nausea and Vomiting and Shortness Of Breath     Codeine Unknown and Shortness Of Breath     Trouble breathing     Morphine GI Disturbance     REVIEW OF SYSTEMS:  A 10 point review of systems was negative except as noted above.    SOCIAL HISTORY:   Social History     Social History     Marital status:  "Single     Spouse name: N/A     Number of children: N/A     Years of education: N/A     Occupational History     Not on file.     Social History Main Topics     Smoking status: Former Smoker     Packs/day: 1.00     Years: 47.00     Types: Cigarettes     Quit date: 1/1/2012     Smokeless tobacco: Never Used     Alcohol use Yes      Comment: minimal     Drug use: No     Sexual activity: Not on file     Other Topics Concern     Not on file     Social History Narrative     FAMILY MEDICAL HISTORY:   Family History   Problem Relation Age of Onset     Cancer Brother      throat     Lung Cancer Paternal Uncle      No known family hx of kidney disease.   Father with congenital solitary kidney.     PHYSICAL EXAM:     /78  Pulse 111  Temp 98.4  F (36.9  C) (Oral)  Resp 18  Ht 1.551 m (5' 1.06\")  Wt 56.8 kg (125 lb 4.8 oz)  BMI 23.63 kg/m2     GENERAL APPEARANCE: alert and no distress  Head NC/AT  EYES: no scleral icterus, pupils equal  HENT: mouth  without ulcers or lesions  NECK: supple, no goiter  Lymphatics: no cervical or supraclavicular LAD  Pulmonary: lungs clear to auscultation with equal breath sounds bilaterally, no clubbing or cyanosis  CV: regular rhythm, reg rate, no rub   - JVP not increased   - Edema: none  GI: soft, nontender, normal bowel sounds, no HSM   MS: no evidence of inflammation in joints, no muscle tenderness  : no Ponce,   SKIN: no rash, warm, dry  NEURO: mentation intact and speech normal    LABS:   External Order Results on 11/13/2018   Component Date Value Ref Range Status     Transplant Date (Replaced by [facility]) 11/12/2018 Evaluation   Final     Echo Type (Replaced by [facility]) 11/12/2018 Routine-Transthoracic   Final     LV Ejection Fraction Percent (Replaced by [facility]) 11/12/2018 60   Final     LV Global Function (Replaced by [facility]) 11/12/2018 Normal   Final     LV Regional Wall Motion (Replaced by [facility]) 11/12/2018 Normal   Final     Mitral Valve Morphology (Replaced by [facility]) 11/12/2018 Normal   Final     Aortic Valve - Aorta (Replaced by [facility]) 11/12/2018 Normal   Final     " Aortic Valve Doppler ([facility]) 11/12/2018 Moderate Regurg   Final     RV Size ([facility]) 11/12/2018 Normal   Final     RV Global Function ([facility]) 11/12/2018 Normal   Final     RV Systolic Pressure ([facility]) 11/12/2018 Normal   Final     Left Atrium ([facility]) 11/12/2018 Normal   Final     Right Atrium ([facility]) 11/12/2018 Normal   Final     Tricuspid Valve Morphology ([facility]) 11/12/2018 Normal   Final     Tricuspid Valve Doppler ([facility]) 11/12/2018 Cannot evaluate   Final     Pulmonary Valve Morphology ([facility]) 11/12/2018 Normal   Final     Pulmonary Valve Doppler ([facility]) 11/12/2018 Trace regurg   Final     M-Mode Measure LVIDd ([facility]) 11/12/2018 4.4   Final     M-Mode Measure LVIDs ([facility]) 11/12/2018 2.9   Final     M-Mode Measure PWD ([facility]) 11/12/2018 0.76   Final     M-Mode Measure IVSD ([facility]) 11/12/2018 0.76   Final     M-Mode Measure AO ([facility]) 11/12/2018 3.3   Final   Orders Only on 11/13/2018   Component Date Value Ref Range Status     6 min walk (FT) 11/13/2018 750  ft Final     6 Min Walk (M) 11/13/2018 229  m Final     FVC-Pred 11/13/2018 2.56  L Final-Edited     FVC-Pre 11/13/2018 1.39  L Final-Edited     FVC-%Pred-Pre 11/13/2018 54  % Final-Edited     FEV1-Pre 11/13/2018 0.55  L Final-Edited     FEV1-%Pred-Pre 11/13/2018 27  % Final-Edited     FEV1FVC-Pred 11/13/2018 76  % Final-Edited     FEV1FVC-Pre 11/13/2018 40  % Final-Edited     FEFMax-Pred 11/13/2018 5.20  L/sec Final-Edited     FEFMax-Pre 11/13/2018 2.01  L/sec Final-Edited     FEFMax-%Pred-Pre 11/13/2018 38  % Final-Edited     FEF2575-Pred 11/13/2018 1.75  L/sec Final-Edited     FEF2575-Pre 11/13/2018 0.18  L/sec Final-Edited     OVN3404-%Pred-Pre 11/13/2018 10  % Final-Edited     ExpTime-Pre 11/13/2018 12.45  sec Final-Edited     FIFMax-Pre 11/13/2018 3.20  L/sec Final-Edited     VC-Pred 11/13/2018 2.68  L Final-Edited     VC-Pre 11/13/2018 1.51  L Final-Edited     VC-%Pred-Pre 11/13/2018 56  % Final-Edited     IC-Pred 11/13/2018 1.99  L Final-Edited     IC-Pre 11/13/2018 1.25  L  Final-Edited     IC-%Pred-Pre 11/13/2018 62  % Final-Edited     ERV-Pred 11/13/2018 0.69  L Final-Edited     ERV-Pre 11/13/2018 0.27  L Final-Edited     ERV-%Pred-Pre 11/13/2018 38  % Final-Edited     FEV1FEV6-Pred 11/13/2018 79  % Final-Edited     FEV1FEV6-Pre 11/13/2018 46  % Final-Edited     FRCPleth-Pred 11/13/2018 2.54  L Final-Edited     FRCPleth-Pre 11/13/2018 4.34  L Final-Edited     FRCPleth-%Pred-Pre 11/13/2018 170  % Final-Edited     RVPleth-Pred 11/13/2018 1.92  L Final-Edited     RVPleth-Pre 11/13/2018 4.07  L Final-Edited     RVPleth-%Pred-Pre 11/13/2018 211  % Final-Edited     TLCPleth-Pred 11/13/2018 4.44  L Final-Edited     TLCPleth-Pre 11/13/2018 5.58  L Final-Edited     TLCPleth-%Pred-Pre 11/13/2018 125  % Final-Edited     DLCOunc-Pred 11/13/2018 19.31  ml/min/mmHg Final-Edited     DLCOunc-Pre 11/13/2018 8.80  ml/min/mmHg Final-Edited     DLCOunc-%Pred-Pre 11/13/2018 45  % Final-Edited     DLCOcor-Pre 11/13/2018 9.43  ml/min/mmHg Final-Edited     DLCOcor-%Pred-Pre 11/13/2018 48  % Final-Edited     VA-Pre 11/13/2018 3.02  L Final-Edited     VA-%Pred-Pre 11/13/2018 66  % Final-Edited     FEV1SVC-Pred 11/13/2018 75  % Final-Edited     FEV1SVC-Pre 11/13/2018 36  % Final-Edited   Orders Only on 11/12/2018   Component Date Value Ref Range Status     ABO 11/12/2018 O   Final     RH(D) 11/12/2018 Pos   Final     Antibody Screen 11/12/2018 Neg   Final     Test Valid Only At 11/12/2018 [facility]      Final     Specimen Expires 11/12/2018 11/15/2018   Final     Antibody Titer 11/12/2018    Final                    Value:Anti A1: IgM >256, IgG >256  Anti B: IgM >256, IgG >256       Antigen Type 11/12/2018 Canceled, Test credited   Final     Blood Bank Comment 11/12/2018 Patient is not ABO type A or AB. A subtyping not applicable. 11.12.18 AK   Final     Cholesterol 11/12/2018 231* <200 mg/dL Final     Triglycerides 11/12/2018 146  <150 mg/dL Final     HDL  Cholesterol 11/12/2018 65  >49 mg/dL Final     LDL Cholesterol Calculated 11/12/2018 136* <100 mg/dL Final     Non HDL Cholesterol 11/12/2018 166* <130 mg/dL Final     Sodium 11/12/2018 139  133 - 144 mmol/L Final     Potassium 11/12/2018 3.8  3.4 - 5.3 mmol/L Final     Chloride 11/12/2018 103  94 - 109 mmol/L Final     Carbon Dioxide 11/12/2018 31  20 - 32 mmol/L Final     Anion Gap 11/12/2018 4  3 - 14 mmol/L Final     Glucose 11/12/2018 84  70 - 99 mg/dL Final     Urea Nitrogen 11/12/2018 20  7 - 30 mg/dL Final     Creatinine 11/12/2018 1.30* 0.52 - 1.04 mg/dL Final     GFR Estimate 11/12/2018 41* >60 mL/min/1.7m2 Final     GFR Estimate If Black 11/12/2018 49* >60 mL/min/1.7m2 Final     Calcium 11/12/2018 8.4* 8.5 - 10.1 mg/dL Final     Bilirubin Total 11/12/2018 0.4  0.2 - 1.3 mg/dL Final     Albumin 11/12/2018 3.1* 3.4 - 5.0 g/dL Final     Protein Total 11/12/2018 6.3* 6.8 - 8.8 g/dL Final     Alkaline Phosphatase 11/12/2018 62  40 - 150 U/L Final     ALT 11/12/2018 23  0 - 50 U/L Final     AST 11/12/2018 17  0 - 45 U/L Final     Amylase 11/12/2018 60  30 - 110 U/L Final     Ph Venous 11/12/2018 7.35  7.32 - 7.43 pH Final     PCO2 Venous 11/12/2018 61* 40 - 50 mm Hg Final     PO2 Venous 11/12/2018 17* 25 - 47 mm Hg Final     Bicarbonate Venous 11/12/2018 34* 21 - 28 mmol/L Final     FIO2 11/12/2018 21   Final     Oxyhemoglobin Venous 11/12/2018 22  % Final     Base Excess Venous 11/12/2018 6.6  mmol/L Final     IGA 11/12/2018 47* 70 - 380 mg/dL Final     IGG 11/12/2018 599* 695 - 1620 mg/dL Final     IgG1 11/12/2018 397  300 - 856 mg/dL Final     IgG2 11/12/2018 107* 158 - 761 mg/dL Final     IgG3 11/12/2018 82  24 - 192 mg/dL Final     IgG4 11/12/2018 11  11 - 86 mg/dL Final     IGE 11/12/2018 <2  0 - 114 KIU/L Final     IGM 11/12/2018 56* 60 - 265 mg/dL Final     Magnesium 11/12/2018 2.1  1.6 - 2.3 mg/dL Final     Phosphorus 11/12/2018 2.1* 2.5 - 4.5 mg/dL Final     Prealbumin 11/12/2018 32  15 - 45 mg/dL  Final     TSH 11/12/2018 0.96  0.40 - 4.00 mU/L Final     Vitamin D Deficiency screening 11/12/2018 102* 20 - 75 ug/L Final     WBC 11/12/2018 8.9  4.0 - 11.0 10e9/L Final     RBC Count 11/12/2018 3.61* 3.8 - 5.2 10e12/L Final     Hemoglobin 11/12/2018 11.4* 11.7 - 15.7 g/dL Final     Hematocrit 11/12/2018 35.4  35.0 - 47.0 % Final     MCV 11/12/2018 98  78 - 100 fl Final     MCH 11/12/2018 31.6  26.5 - 33.0 pg Final     MCHC 11/12/2018 32.2  31.5 - 36.5 g/dL Final     RDW 11/12/2018 13.2  10.0 - 15.0 % Final     Platelet Count 11/12/2018 228  150 - 450 10e9/L Final     Diff Method 11/12/2018 Automated Method   Final     % Neutrophils 11/12/2018 70.5  % Final     % Lymphocytes 11/12/2018 19.6  % Final     % Monocytes 11/12/2018 7.5  % Final     % Eosinophils 11/12/2018 1.3  % Final     % Basophils 11/12/2018 0.3  % Final     % Immature Granulocytes 11/12/2018 0.8  % Final     Nucleated RBCs 11/12/2018 0  0 /100 Final     Absolute Neutrophil 11/12/2018 6.3  1.6 - 8.3 10e9/L Final     Absolute Lymphocytes 11/12/2018 1.8  0.8 - 5.3 10e9/L Final     Absolute Monocytes 11/12/2018 0.7  0.0 - 1.3 10e9/L Final     Absolute Eosinophils 11/12/2018 0.1  0.0 - 0.7 10e9/L Final     Absolute Basophils 11/12/2018 0.0  0.0 - 0.2 10e9/L Final     Abs Immature Granulocytes 11/12/2018 0.1  0 - 0.4 10e9/L Final     Absolute Nucleated RBC 11/12/2018 0.0   Final     ABTest Method 11/12/2018 SSOP   Final     A* locus 11/12/2018 A*02   Final     A* 11/12/2018 A*03   Final     B* locus 11/12/2018 B*07   Final     B* 11/12/2018 B*27   Final     C* locus 11/12/2018 C*02   Final     C* 11/12/2018 C*07   Final     Bw-1 11/12/2018 Bw*6   Final     Bw-2 11/12/2018 Bw*4   Final     Drsso Test Method 11/12/2018 SSOP   Final     DRB1* locus 11/12/2018 DRB1*01   Final     DRB1* 11/12/2018 DRB1*16   Final     DRB5* locus 11/12/2018 DRB5*02   Final     DQB1* locus 11/12/2018 DQB1*05   Final     DQA1*locus 11/12/2018 DQA1*01   Final     DPB1* 11/12/2018  DPB1*03:01   Final     DPB1* NMDP 11/12/2018 BEKTW   Final     DPB1*locus 11/12/2018 DPB1*04:02   Final     DPB1* locus NMDP 11/12/2018 BEKTX   Final     DPA1* 11/12/2018 DPA1*01:03   Final     DPA1* NMDP 11/12/2018 AAC   Final     Hepatitis A Antibody IgG 11/12/2018 Reactive* NR^Nonreactive Final     Hepatitis B Core Jami 11/12/2018 Nonreactive  NR^Nonreactive Final     Hepatitis B Surface Antibody 11/12/2018 0.00  <8.00 m[IU]/mL Final     Hep B Surface Agn 11/12/2018 Nonreactive  NR^Nonreactive Final     Hepatitis C Antibody 11/12/2018 Nonreactive  NR^Nonreactive Final     Herpes Simplex Virus Type 1 IgG 11/12/2018 1.9* 0.0 - 0.8 AI Final     Herpes Simplex Virus Type 2 IgG 11/12/2018 <0.2  0.0 - 0.8 AI Final     HIV Antigen Antibody Combo Pretran* 11/12/2018 Nonreactive  NR^Nonreactive Final     Toxoplasma Antibody IgG 11/12/2018 16.8* 0.0 - 7.1 IU/mL Final     Varicella Zoster Virus Antibody IgG 11/12/2018 0.9* 0.0 - 0.8 AI Final     CMV Antibody IgG 11/12/2018 >8.0* 0.0 - 0.8 AI Final     EBV Capsid Antibody IgG 11/12/2018 6.6* 0.0 - 0.8 AI Final     Hemoglobin A1C 11/12/2018 5.3  0 - 5.6 % Final     ABO 11/12/2018 O   Final     RH(D) 11/12/2018 Pos   Final     Specimen Expires 11/12/2018 11/15/2018   Final     Quantiferon-TB Gold Plus Result 11/12/2018 Negative  NEG^Negative Final     TB1 Ag minus Nil Value 11/12/2018 0.00  IU/mL Final     TB2 Ag minus Nil Value 11/12/2018 0.01  IU/mL Final     Mitogen minus Nil Result 11/12/2018 >10.00  IU/mL Final     Nil Result 11/12/2018 0.05  IU/mL Final     Color Urine 11/12/2018 Yellow   Final     Appearance Urine 11/12/2018 Clear   Final     Glucose Urine 11/12/2018 Negative  NEG^Negative mg/dL Final     Bilirubin Urine 11/12/2018 Negative  NEG^Negative Final     Ketones Urine 11/12/2018 Negative  NEG^Negative mg/dL Final     Specific Gravity Urine 11/12/2018 1.014  1.003 - 1.035 Final     Blood Urine 11/12/2018 Negative  NEG^Negative Final     pH Urine 11/12/2018 6.0   5.0 - 7.0 pH Final     Protein Albumin Urine 11/12/2018 Negative  NEG^Negative mg/dL Final     Urobilinogen mg/dL 11/12/2018 0.0  0.0 - 2.0 mg/dL Final     Nitrite Urine 11/12/2018 Negative  NEG^Negative Final     Leukocyte Esterase Urine 11/12/2018 Moderate* NEG^Negative Final     Source 11/12/2018 Midstream Urine   Final     WBC Urine 11/12/2018 13* 0 - 5 /HPF Final     RBC Urine 11/12/2018 3* 0 - 2 /HPF Final     Squamous Epithelial /HPF Urine 11/12/2018 2* 0 - 1 /HPF Final     Mucous Urine 11/12/2018 Present* NEG^Negative /LPF Final     Specimen Description 11/12/2018 Midstream Urine   Final     Special Requests 11/12/2018 Specimen received in preservative   Final     Culture Micro 11/12/2018 *  Final                    Value:10,000 to 50,000 colonies/mL  Enterococcus faecalis       Delvin Kenyon MD

## 2018-11-15 NOTE — MR AVS SNAPSHOT
After Visit Summary   11/15/2018    Chantal Bernal    MRN: [id number]           Patient Information     Date Of Birth          1950        Visit Information        Provider Department      11/15/2018 11:00 AM  LOS PATIENT 5 M [facility]        Today's Diagnoses     Elevated serum creatinine    -  1    CKD (chronic kidney disease) stage 3, GFR 30-59 ml/min (H)        Chronic obstructive pulmonary disease, unspecified COPD type (H)        Lung transplant candidate        Hypervitaminosis D        Hypocalcemia        Anemia due to other cause, not classified        Osteoporosis, unspecified osteoporosis type, unspecified pathological fracture presence        Renal cyst        Pulmonary nodules           Follow-ups after your visit        Your next 10 appointments already scheduled     Nov 15, 2018  2:30 PM CST   US RENAL COMPLETE with US1    [facility])    [address] [address] [phone]           How do I prepare for my exam? (Food and drink instructions) No Food and Drink Restrictions.  How do I prepare for my exam? (Other instructions) You do not need to do anything special to prepare for your exam.  What should I wear: Wear comfortable clothes.  How long does the exam take: Most ultrasounds take 30 to 60 minutes.  What should I bring: Bring a list of your medicines, including vitamins, minerals and over-the-counter drugs. It is safest to leave personal items at home.  Do I need a :  No  is needed.  What do I need to tell my doctor: Tell your doctor about any allergies you may have.  What should I do after the exam: No restrictions, You may resume normal activities.  What is this test: An ultrasound uses sound waves to make pictures of the body. Sound waves do not cause pain. The only discomfort may be the pressure of the wand against your skin or full bladder.  Who  "should I call with questions: If you have any questions, please call the Imaging Department where you will have your exam. Directions, parking instructions, and other information is available on our website, [email or web address].            Nov 27, 2018  9:40 AM CST   (Arrive by 9:25 AM)   Return Pre-Transplant with Saud Hoffman MD   [facility] ([facility])    [address]  3rd Floor  [facility] [id number] [phone]            Nov 27, 2018 11:00 AM CST   (Arrive by 10:45 AM)   New Patient Visit with Sameera Middleton MD   [facility] ([facility])    [address]  [address]  [place] [address] [phone]              Who to contact     If you have questions or need follow up information about today's clinic visit or your schedule please contact [facility] directly at [phone].  Normal or non-critical lab and imaging results will be communicated to you by MyChart, letter or phone within 4 business days after the clinic has received the results. If you do not hear from us within 7 days, please contact the clinic through MyChart or phone. If you have a critical or abnormal lab result, we will notify you by phone as soon as possible.  Submit refill requests through [employer] or call your pharmacy and they will forward the refill request to us. Please allow 3 business days for your refill to be completed.          Additional Information About Your Visit        Care EveryWhere ID     This is your Care EveryWhere ID. This could be used by other organizations to access your [facility] medical records  [id number]        Your Vitals Were     Pulse Temperature Respirations Height BMI (Body Mass Index)       111 98.4  F (36.9  C) (Oral) 18 1.551 m (5' 1.06\") 23.63 kg/m2        Blood Pressure from Last 3 Encounters:   11/15/18 122/78   11/12/18 129/79   07/16/18 110/67    " Weight from Last 3 Encounters:   11/15/18 56.8 kg (125 lb 4.8 oz)   11/12/18 56.2 kg (124 lb)   07/16/18 57.3 kg (126 lb 6.4 oz)                 Today's Medication Changes          These changes are accurate as of 11/15/18  1:39 PM.  If you have any questions, ask your nurse or doctor.               Start taking these medicines.        Dose/Directions    levofloxacin 250 MG tablet   Commonly known as:  LEVAQUIN   Used for:  Urinary tract infection, COPD (chronic obstructive pulmonary disease) (H)   Started by:  Montse Zambrano RN        Dose:  250 mg   Take 1 tablet (250 mg) by mouth daily for 5 days   Quantity:  5 tablet   Refills:  0            Where to get your medicines      These medications were sent to [facility] [address] [place] [address] [address] [address]    Hours:  TRANSPLANT PHONE NUMBER [phone] Phone:  [phone]     levofloxacin 250 MG tablet               Information about OPIOIDS     PRESCRIPTION OPIOIDS: WHAT YOU NEED TO KNOW   We gave you an opioid (narcotic) pain medicine. It is important to manage your pain, but opioids are not always the best choice. You should first try all the other options your care team gave you. Take this medicine for as short a time (and as few doses) as possible.    Some activities can increase your pain, such as bandage changes or therapy sessions. It may help to take your pain medicine 30 to 60 minutes before these activities. Reduce your stress by getting enough sleep, working on hobbies you enjoy and practicing relaxation or meditation. Talk to your care team about ways to manage your pain beyond prescription opioids.    These medicines have risks:    DO NOT drive when on new or higher doses of pain medicine. These medicines can affect your alertness and reaction times, and you could be arrested for driving under the influence (DUI). If you need to use opioids long-term, talk to your  care team about driving.    DO NOT operate heavy machinery    DO NOT do any other dangerous activities while taking these medicines.    DO NOT drink any alcohol while taking these medicines.     If the opioid prescribed includes acetaminophen, DO NOT take with any other medicines that contain acetaminophen. Read all labels carefully. Look for the word  acetaminophen  or  Tylenol.  Ask your pharmacist if you have questions or are unsure.    You can get addicted to pain medicines, especially if you have a history of addiction (chemical, alcohol or substance dependence). Talk to your care team about ways to reduce this risk.    All opioids tend to cause constipation. Drink plenty of water and eat foods that have a lot of fiber, such as fruits, vegetables, prune juice, apple juice and high-fiber cereal. Take a laxative (Miralax, milk of magnesia, Colace, Senna) if you don t move your bowels at least every other day. Other side effects include upset stomach, sleepiness, dizziness, throwing up, tolerance (needing more of the medicine to have the same effect), physical dependence and slowed breathing.    Store your pills in a secure place, locked if possible. We will not replace any lost or stolen medicine. If you don t finish your medicine, please throw away (dispose) as directed by your pharmacist. The [place] [employer] has more information about safe disposal: https://www.pca.[facility].[place].us/living-green/managing-unwanted-medications         Primary Care Provider Office Phone # Fax #    Sujatha Mayfield PA-C [phone]       [place] [address]        Equal Access to Services     JOSE LUTHER : Hadii aad ku hadasho Soomaali, waaxda luqadaha, qaybta kaalmada adeegyada, matt mncair . So [facility] [phone].    ATENCIÓN: Si habla español, tiene a paula disposición servicios gratuitos de asistencia lingüística. Llame al  [phone].    We comply with applicable federal civil rights laws and [place] laws. We do not discriminate on the basis of race, color, national origin, age, disability, sex, sexual orientation, or gender identity.            Thank you!     Thank you for choosing [facility]  for your care. Our goal is always to provide you with excellent care. Hearing back from our patients is one way we can continue to improve our services. Please take a few minutes to complete the written survey that you may receive in the mail after your visit with us. Thank you!             Your Updated Medication List - Protect others around you: Learn how to safely use, store and throw away your medicines at www.disposemymeds.org.          This list is accurate as of 11/15/18  1:39 PM.  Always use your most recent med list.                   Brand Name Dispense Instructions for use Diagnosis    * albuterol (2.5 MG/3ML) 0.083% neb solution      TAKE 3 ML IN NEBULIZER EVERY FOUR HOURS AS NEEDED FOR SHORTNESS OF BREATH        * VENTOLIN  (90 Base) MCG/ACT inhaler   Generic drug:  albuterol           aspirin 81 MG tablet     30 tablet    Take 1 tablet (81 mg) by mouth daily    COPD (chronic obstructive pulmonary disease) (H)       azithromycin 500 MG tablet    ZITHROMAX          budesonide 1 MG/2ML Susp neb solution    PULMICORT     Inhale 1 mg into the lungs        buPROPion 300 MG 24 hr tablet    WELLBUTRIN XL     Take 300 mg by mouth        furosemide 20 MG tablet    LASIX     Take 20 mg by mouth        levofloxacin 250 MG tablet    LEVAQUIN    5 tablet    Take 1 tablet (250 mg) by mouth daily for 5 days    Urinary tract infection, COPD (chronic obstructive pulmonary disease) (H)       LORazepam 0.5 MG tablet    ATIVAN     as needed        losartan 50 MG tablet    COZAAR     Take 50 mg by mouth        metoprolol succinate 50 MG 24 hr tablet    TOPROL-XL     TAKE 1/2 TABLET BY MOUTH TWO TIMES A DAY         montelukast 10 MG tablet    SINGULAIR     Take 10 mg by mouth        morphine 15 MG IR tablet    MSIR     as needed        predniSONE 5 MG tablet    DELTASONE     TAKE 1 TAB BY MOUTH ONE TIME A DAY. TAKE DAILY WITH FOOD.  INDICATIONS: CHRONIC OBSTRUCTIVE LUNG DISEASE, TO START WHEN DONE WITH TAPER        TYLENOL 500 MG tablet   Generic drug:  acetaminophen           * Notice:  This list has 2 medication(s) that are the same as other medications prescribed for you. Read the directions carefully, and ask your doctor or other care provider to review them with you.

## 2018-11-15 NOTE — LETTER
11/15/2018      RE: Chantal Bernal  [address]  [facility] [address]       Transplant Nephrology Initial Consult  November 15, 2018      Chantal Bernal MRN:[id number] YOB: 1950  Date of Admission:(Not on file)  Primary care provider: Sujatha Mayfield    ASSESSMENT AND RECOMMENDATIONS:   1. Elevated creatinine: I have asked for the records for her labs in the past to be able to compare the creatinine from the November 12 draw in our system.  If the creatinine has been elevated in the past it is likely that the patient does have  Moderate chronic kidney disease stage IIIb.  The underlying etiology of the patient's chronic kidney disease would be unclear.  She is on antihypertensive therapy currently but this was mainly for her heart and had not formally been told that she was hypertensive in the past.  Blood pressure today is beautifully controlled at 122/78.  She has a urinalysis that is dipstick negative for albuminuria.  I will further quantitate her proteinuria status with a urine protein to creatinine ratio as well as a urine albumin to creatinine ratio.  Given her family history I will pursue a renal ultrasound to evaluate for any structural abnormalities.  Last, given her anemia I will check a serum protein electrophoresis as well as serum free light chain to rule out paraproteinemia as the underlying cause of the creatinine elevation and anemia.    However, it is entirely possible that the patient just has lower kidney function in the average patient in her age category.  If she is non-proteinuric and has blood pressure that is well controlled she will lose on average 1 mL/min/year.  She does not need to be considered for kidney transplant at this time.  I told her that if she pursues lung transplantation of course she would be at increased risk of acute kidney injury as well as nephrotoxicity due to some of the immunosuppression medicines.  However this is not a contraindication to her  proceeding with evaluation for lung transplant.    I spent 65 minutes with this patient of which greater than 35 minutes was spent face-to-face counseling regarding the etiology workup and management of her increased creatinine.  All questions were answered.    2. HYPERvitaminosis D: not on vitamin d currently. Monitor.     3. HYPOcalcemia: corrects to 9.4 mg / dl for degree of hypoalbuminemia.    4. COPD with co2 retention: per pulmonary txp team.    5. Anemia: mild anemia at 11.4 g / dl today. MCV 98.    6. Leukocyturia with squamous cells noted on UA: no symptoms of UTI today with likely contamination. Urine culture shows enterococcus at 10-50,000 enterococcus that is pan-sensitive.    7. Osteoporosis: ok for bisphosphonate with this kidney function vs. prolia per PCP    8. Renal cyst: renal imaging with ultrasound to categorize this    9. Pulm nodules: per pulm tx.     REASON FOR CONSULT: Elevated creatinine    HISTORY OF PRESENT ILLNESS:  Chantal Bernal is a 67 year old with hx of COPD being considered for lung transplantation here at the [facility] who was referred for elevated creatinine / evaluation for CKD.    The patient is a 67-year-old female with history of severe COPD with multiple exacerbations that was sent to us to be evaluated for a lung transplant.  During the pre-evaluation laboratory results, it was found that the patient's creatinine is 1.3 mg/dL.  The patient has no available creatinines for me to evaluate in the past.  However, the patient has not been told that she had any chronic kidney disease prior to these labs.    In terms of her past medical history the patient has no history of severe hypertension.  Albeit, the patient is on metoprolol and losartan at this time.    She has never been a large user of nonsteroidal anti-inflammatory drugs.    She uses no supplements over-the-counter.    She has no known family history of chronic kidney disease.  However, she does have a  father that was born with congenital agenesis of the kidney.    Today, the patient feels reasonably well.  She is at her baseline in terms of her breathing.  Her last exacerbation of COPD that necessitated hospitalization was in June of this year.    She denies any chest pain or breathing difficulties.  She has no nausea or vomiting.  She has no fever shakes or chills.  She has no constipation or diarrhea.    From a medicine perspective, the patient has been on losartan 50 mg daily for the last year per her report.  She also notes that she has been intermittently placed on furosemide in the past.    PAST MEDICAL HISTORY:  Reviewed with patient on 11/15/2018     Past Medical History:   Diagnosis Date     Anemia      COPD, severe (H)      Elevated serum creatinine      Hypervitaminosis D      Osteoporosis      Osteoporosis      Pulmonary nodules      Renal cyst      Past Surgical History:   Procedure Laterality Date     CHOLECYSTECTOMY       OTHER SURGICAL HISTORY      Stated had a hysterectomy at [facility] around 2012.      MEDICATIONS:  Current meds  Prior to Admission medications    Medication Sig Last Dose Taking? Auth Provider   acetaminophen (TYLENOL) 500 MG tablet    Reported, Patient   albuterol (2.5 MG/3ML) 0.083% neb solution TAKE 3 ML IN NEBULIZER EVERY FOUR HOURS AS NEEDED FOR SHORTNESS OF BREATH   Reported, Patient   aspirin 81 MG tablet Take 1 tablet (81 mg) by mouth daily   Saud Hoffman MD   azithromycin (ZITHROMAX) 500 MG tablet    Reported, Patient   budesonide (PULMICORT) 1 MG/2ML SUSP neb solution Inhale 1 mg into the lungs   Reported, Patient   VENTOLIN  (90 Base) MCG/ACT Inhaler    Reported, Patient      ALLERGIES:    Allergies   Allergen Reactions     Arformoterol Nausea and Vomiting and Shortness Of Breath     Codeine Unknown and Shortness Of Breath     Trouble breathing     Morphine GI Disturbance     REVIEW OF SYSTEMS:  A 10 point review of systems was negative except as  "noted above.    SOCIAL HISTORY:   Social History     Social History     Marital status: Single     Spouse name: N/A     Number of children: N/A     Years of education: N/A     Occupational History     Not on file.     Social History Main Topics     Smoking status: Former Smoker     Packs/day: 1.00     Years: 47.00     Types: Cigarettes     Quit date: 1/1/2012     Smokeless tobacco: Never Used     Alcohol use Yes      Comment: minimal     Drug use: No     Sexual activity: Not on file     Other Topics Concern     Not on file     Social History Narrative     FAMILY MEDICAL HISTORY:   Family History   Problem Relation Age of Onset     Cancer Brother      throat     Lung Cancer Paternal Uncle      No known family hx of kidney disease.   Father with congenital solitary kidney.     PHYSICAL EXAM:     /78  Pulse 111  Temp 98.4  F (36.9  C) (Oral)  Resp 18  Ht 1.551 m (5' 1.06\")  Wt 56.8 kg (125 lb 4.8 oz)  BMI 23.63 kg/m2     GENERAL APPEARANCE: alert and no distress  Head NC/AT  EYES: no scleral icterus, pupils equal  HENT: mouth  without ulcers or lesions  NECK: supple, no goiter  Lymphatics: no cervical or supraclavicular LAD  Pulmonary: lungs clear to auscultation with equal breath sounds bilaterally, no clubbing or cyanosis  CV: regular rhythm, reg rate, no rub   - JVP not increased   - Edema: none  GI: soft, nontender, normal bowel sounds, no HSM   MS: no evidence of inflammation in joints, no muscle tenderness  : no Ponce,   SKIN: no rash, warm, dry  NEURO: mentation intact and speech normal    LABS:   External Order Results on 11/13/2018   Component Date Value Ref Range Status     Transplant Date ([facility]) 11/12/2018 Evaluation   Final     Echo Type ([facility]) 11/12/2018 Routine-Transthoracic   Final     LV Ejection Fraction Percent (E[facility]) 11/12/2018 60   Final     LV Global Function (E[facility]) 11/12/2018 Normal   Final     LV Regional Wall Motion (ECH) 11/12/2018 Normal   Final     Mitral Valve Morphology ([facility]) " 11/12/2018 Normal   Final     Aortic Valve - Aorta (E[facility]) 11/12/2018 Normal   Final     Aortic Valve Doppler (E[facility]) 11/12/2018 Moderate Regurg   Final     RV Size (E[facility]) 11/12/2018 Normal   Final     RV Global Function (E[facility]) 11/12/2018 Normal   Final     RV Systolic Pressure (E[facility]) 11/12/2018 Normal   Final     Left Atrium (ECH) 11/12/2018 Normal   Final     Right Atrium (ECH) 11/12/2018 Normal   Final     Tricuspid Valve Morphology ([facility]) 11/12/2018 Normal   Final     Tricuspid Valve Doppler ([facility]) 11/12/2018 Cannot evaluate   Final     Pulmonary Valve Morphology ([facility]) 11/12/2018 Normal   Final     Pulmonary Valve Doppler ([facility]) 11/12/2018 Trace regurg   Final     M-Mode Measure LVIDd ([facility]) 11/12/2018 4.4   Final     M-Mode Measure LVIDs ([facility]) 11/12/2018 2.9   Final     M-Mode Measure PWD ([facility]) 11/12/2018 0.76   Final     M-Mode Measure IVSD ([facility]) 11/12/2018 0.76   Final     M-Mode Measure AO ([facility]) 11/12/2018 3.3   Final   Orders Only on 11/13/2018   Component Date Value Ref Range Status     6 min walk (FT) 11/13/2018 750  ft Final     6 Min Walk (M) 11/13/2018 229  m Final     FVC-Pred 11/13/2018 2.56  L Final-Edited     FVC-Pre 11/13/2018 1.39  L Final-Edited     FVC-%Pred-Pre 11/13/2018 54  % Final-Edited     FEV1-Pre 11/13/2018 0.55  L Final-Edited     FEV1-%Pred-Pre 11/13/2018 27  % Final-Edited     FEV1FVC-Pred 11/13/2018 76  % Final-Edited     FEV1FVC-Pre 11/13/2018 40  % Final-Edited     FEFMax-Pred 11/13/2018 5.20  L/sec Final-Edited     FEFMax-Pre 11/13/2018 2.01  L/sec Final-Edited     FEFMax-%Pred-Pre 11/13/2018 38  % Final-Edited     FEF2575-Pred 11/13/2018 1.75  L/sec Final-Edited     FEF2575-Pre 11/13/2018 0.18  L/sec Final-Edited     BVM8253-%Pred-Pre 11/13/2018 10  % Final-Edited     ExpTime-Pre 11/13/2018 12.45  sec Final-Edited     FIFMax-Pre 11/13/2018 3.20  L/sec Final-Edited     VC-Pred 11/13/2018 2.68  L Final-Edited     VC-Pre 11/13/2018 1.51  L Final-Edited     VC-%Pred-Pre 11/13/2018 56  %  Final-Edited     IC-Pred 11/13/2018 1.99  L Final-Edited     IC-Pre 11/13/2018 1.25  L Final-Edited     IC-%Pred-Pre 11/13/2018 62  % Final-Edited     ERV-Pred 11/13/2018 0.69  L Final-Edited     ERV-Pre 11/13/2018 0.27  L Final-Edited     ERV-%Pred-Pre 11/13/2018 38  % Final-Edited     FEV1FEV6-Pred 11/13/2018 79  % Final-Edited     FEV1FEV6-Pre 11/13/2018 46  % Final-Edited     FRCPleth-Pred 11/13/2018 2.54  L Final-Edited     FRCPleth-Pre 11/13/2018 4.34  L Final-Edited     FRCPleth-%Pred-Pre 11/13/2018 170  % Final-Edited     RVPleth-Pred 11/13/2018 1.92  L Final-Edited     RVPleth-Pre 11/13/2018 4.07  L Final-Edited     RVPleth-%Pred-Pre 11/13/2018 211  % Final-Edited     TLCPleth-Pred 11/13/2018 4.44  L Final-Edited     TLCPleth-Pre 11/13/2018 5.58  L Final-Edited     TLCPleth-%Pred-Pre 11/13/2018 125  % Final-Edited     DLCOunc-Pred 11/13/2018 19.31  ml/min/mmHg Final-Edited     DLCOunc-Pre 11/13/2018 8.80  ml/min/mmHg Final-Edited     DLCOunc-%Pred-Pre 11/13/2018 45  % Final-Edited     DLCOcor-Pre 11/13/2018 9.43  ml/min/mmHg Final-Edited     DLCOcor-%Pred-Pre 11/13/2018 48  % Final-Edited     VA-Pre 11/13/2018 3.02  L Final-Edited     VA-%Pred-Pre 11/13/2018 66  % Final-Edited     FEV1SVC-Pred 11/13/2018 75  % Final-Edited     FEV1SVC-Pre 11/13/2018 36  % Final-Edited   Orders Only on 11/12/2018   Component Date Value Ref Range Status     ABO 11/12/2018 O   Final     RH(D) 11/12/2018 Pos   Final     Antibody Screen 11/12/2018 Neg   Final     [facility]      Final     Specimen Expires 11/12/2018 11/15/2018   Final     Antibody Titer 11/12/2018    Final                    Value:Anti A1: IgM >256, IgG >256  Anti B: IgM >256, IgG >256       Antigen Type 11/12/2018 Canceled, Test credited   Final     Blood Bank Comment 11/12/2018 Patient is not ABO type A or AB. A subtyping not applicable. 11.12.18 AK   Final     Cholesterol 11/12/2018  231* <200 mg/dL Final     Triglycerides 11/12/2018 146  <150 mg/dL Final     HDL Cholesterol 11/12/2018 65  >49 mg/dL Final     LDL Cholesterol Calculated 11/12/2018 136* <100 mg/dL Final     Non HDL Cholesterol 11/12/2018 166* <130 mg/dL Final     Sodium 11/12/2018 139  133 - 144 mmol/L Final     Potassium 11/12/2018 3.8  3.4 - 5.3 mmol/L Final     Chloride 11/12/2018 103  94 - 109 mmol/L Final     Carbon Dioxide 11/12/2018 31  20 - 32 mmol/L Final     Anion Gap 11/12/2018 4  3 - 14 mmol/L Final     Glucose 11/12/2018 84  70 - 99 mg/dL Final     Urea Nitrogen 11/12/2018 20  7 - 30 mg/dL Final     Creatinine 11/12/2018 1.30* 0.52 - 1.04 mg/dL Final     GFR Estimate 11/12/2018 41* >60 mL/min/1.7m2 Final     GFR Estimate If Black 11/12/2018 49* >60 mL/min/1.7m2 Final     Calcium 11/12/2018 8.4* 8.5 - 10.1 mg/dL Final     Bilirubin Total 11/12/2018 0.4  0.2 - 1.3 mg/dL Final     Albumin 11/12/2018 3.1* 3.4 - 5.0 g/dL Final     Protein Total 11/12/2018 6.3* 6.8 - 8.8 g/dL Final     Alkaline Phosphatase 11/12/2018 62  40 - 150 U/L Final     ALT 11/12/2018 23  0 - 50 U/L Final     AST 11/12/2018 17  0 - 45 U/L Final     Amylase 11/12/2018 60  30 - 110 U/L Final     Ph Venous 11/12/2018 7.35  7.32 - 7.43 pH Final     PCO2 Venous 11/12/2018 61* 40 - 50 mm Hg Final     PO2 Venous 11/12/2018 17* 25 - 47 mm Hg Final     Bicarbonate Venous 11/12/2018 34* 21 - 28 mmol/L Final     FIO2 11/12/2018 21   Final     Oxyhemoglobin Venous 11/12/2018 22  % Final     Base Excess Venous 11/12/2018 6.6  mmol/L Final     IGA 11/12/2018 47* 70 - 380 mg/dL Final     IGG 11/12/2018 599* 695 - 1620 mg/dL Final     IgG1 11/12/2018 397  300 - 856 mg/dL Final     IgG2 11/12/2018 107* 158 - 761 mg/dL Final     IgG3 11/12/2018 82  24 - 192 mg/dL Final     IgG4 11/12/2018 11  11 - 86 mg/dL Final     IGE 11/12/2018 <2  0 - 114 KIU/L Final     IGM 11/12/2018 56* 60 - 265 mg/dL Final     Magnesium 11/12/2018 2.1  1.6 - 2.3 mg/dL Final     Phosphorus  11/12/2018 2.1* 2.5 - 4.5 mg/dL Final     Prealbumin 11/12/2018 32  15 - 45 mg/dL Final     TSH 11/12/2018 0.96  0.40 - 4.00 mU/L Final     Vitamin D Deficiency screening 11/12/2018 102* 20 - 75 ug/L Final     WBC 11/12/2018 8.9  4.0 - 11.0 10e9/L Final     RBC Count 11/12/2018 3.61* 3.8 - 5.2 10e12/L Final     Hemoglobin 11/12/2018 11.4* 11.7 - 15.7 g/dL Final     Hematocrit 11/12/2018 35.4  35.0 - 47.0 % Final     MCV 11/12/2018 98  78 - 100 fl Final     MCH 11/12/2018 31.6  26.5 - 33.0 pg Final     MCHC 11/12/2018 32.2  31.5 - 36.5 g/dL Final     RDW 11/12/2018 13.2  10.0 - 15.0 % Final     Platelet Count 11/12/2018 228  150 - 450 10e9/L Final     Diff Method 11/12/2018 Automated Method   Final     % Neutrophils 11/12/2018 70.5  % Final     % Lymphocytes 11/12/2018 19.6  % Final     % Monocytes 11/12/2018 7.5  % Final     % Eosinophils 11/12/2018 1.3  % Final     % Basophils 11/12/2018 0.3  % Final     % Immature Granulocytes 11/12/2018 0.8  % Final     Nucleated RBCs 11/12/2018 0  0 /100 Final     Absolute Neutrophil 11/12/2018 6.3  1.6 - 8.3 10e9/L Final     Absolute Lymphocytes 11/12/2018 1.8  0.8 - 5.3 10e9/L Final     Absolute Monocytes 11/12/2018 0.7  0.0 - 1.3 10e9/L Final     Absolute Eosinophils 11/12/2018 0.1  0.0 - 0.7 10e9/L Final     Absolute Basophils 11/12/2018 0.0  0.0 - 0.2 10e9/L Final     Abs Immature Granulocytes 11/12/2018 0.1  0 - 0.4 10e9/L Final     Absolute Nucleated RBC 11/12/2018 0.0   Final     ABTest Method 11/12/2018 SSOP   Final     A* locus 11/12/2018 A*02   Final     A* 11/12/2018 A*03   Final     B* locus 11/12/2018 B*07   Final     B* 11/12/2018 B*27   Final     C* locus 11/12/2018 C*02   Final     C* 11/12/2018 C*07   Final     Bw-1 11/12/2018 Bw*6   Final     Bw-2 11/12/2018 Bw*4   Final     Drsso Test Method 11/12/2018 SSOP   Final     DRB1* locus 11/12/2018 DRB1*01   Final     DRB1* 11/12/2018 DRB1*16   Final     DRB5* locus 11/12/2018 DRB5*02   Final     DQB1* locus  11/12/2018 DQB1*05   Final     DQA1*locus 11/12/2018 DQA1*01   Final     DPB1* 11/12/2018 DPB1*03:01   Final     DPB1* NMDP 11/12/2018 BEKTW   Final     DPB1*locus 11/12/2018 DPB1*04:02   Final     DPB1* locus NMDP 11/12/2018 BEKTX   Final     DPA1* 11/12/2018 DPA1*01:03   Final     DPA1* NMDP 11/12/2018 AAC   Final     Hepatitis A Antibody IgG 11/12/2018 Reactive* NR^Nonreactive Final     Hepatitis B Core Jami 11/12/2018 Nonreactive  NR^Nonreactive Final     Hepatitis B Surface Antibody 11/12/2018 0.00  <8.00 m[IU]/mL Final     Hep B Surface Agn 11/12/2018 Nonreactive  NR^Nonreactive Final     Hepatitis C Antibody 11/12/2018 Nonreactive  NR^Nonreactive Final     Herpes Simplex Virus Type 1 IgG 11/12/2018 1.9* 0.0 - 0.8 AI Final     Herpes Simplex Virus Type 2 IgG 11/12/2018 <0.2  0.0 - 0.8 AI Final     HIV Antigen Antibody Combo Pretran* 11/12/2018 Nonreactive  NR^Nonreactive Final     Toxoplasma Antibody IgG 11/12/2018 16.8* 0.0 - 7.1 IU/mL Final     Varicella Zoster Virus Antibody IgG 11/12/2018 0.9* 0.0 - 0.8 AI Final     CMV Antibody IgG 11/12/2018 >8.0* 0.0 - 0.8 AI Final     EBV Capsid Antibody IgG 11/12/2018 6.6* 0.0 - 0.8 AI Final     Hemoglobin A1C 11/12/2018 5.3  0 - 5.6 % Final     ABO 11/12/2018 O   Final     RH(D) 11/12/2018 Pos   Final     Specimen Expires 11/12/2018 11/15/2018   Final     Quantiferon-TB Gold Plus Result 11/12/2018 Negative  NEG^Negative Final     TB1 Ag minus Nil Value 11/12/2018 0.00  IU/mL Final     TB2 Ag minus Nil Value 11/12/2018 0.01  IU/mL Final     Mitogen minus Nil Result 11/12/2018 >10.00  IU/mL Final     Nil Result 11/12/2018 0.05  IU/mL Final     Color Urine 11/12/2018 Yellow   Final     Appearance Urine 11/12/2018 Clear   Final     Glucose Urine 11/12/2018 Negative  NEG^Negative mg/dL Final     Bilirubin Urine 11/12/2018 Negative  NEG^Negative Final     Ketones Urine 11/12/2018 Negative  NEG^Negative mg/dL Final     Specific Gravity Urine 11/12/2018 1.014  1.003 - 1.035  Final     Blood Urine 11/12/2018 Negative  NEG^Negative Final     pH Urine 11/12/2018 6.0  5.0 - 7.0 pH Final     Protein Albumin Urine 11/12/2018 Negative  NEG^Negative mg/dL Final     Urobilinogen mg/dL 11/12/2018 0.0  0.0 - 2.0 mg/dL Final     Nitrite Urine 11/12/2018 Negative  NEG^Negative Final     Leukocyte Esterase Urine 11/12/2018 Moderate* NEG^Negative Final     Source 11/12/2018 Midstream Urine   Final     WBC Urine 11/12/2018 13* 0 - 5 /HPF Final     RBC Urine 11/12/2018 3* 0 - 2 /HPF Final     Squamous Epithelial /HPF Urine 11/12/2018 2* 0 - 1 /HPF Final     Mucous Urine 11/12/2018 Present* NEG^Negative /LPF Final     Specimen Description 11/12/2018 Midstream Urine   Final     Special Requests 11/12/2018 Specimen received in preservative   Final     Culture Micro 11/12/2018 *  Final                    Value:10,000 to 50,000 colonies/mL  Enterococcus faecalis       Delvin Kenyon MD        E

## 2018-11-15 NOTE — PROGRESS NOTES
Discussed patient UA results with Dr Hoffman. Concern for possible UTI. Verbal order for Levaquin 5 days, renal dosing. Calculated CrCl 30. Confirmed with Gus Griffiths MD 250mg daily appropriate for 5 days. Rx completed and sent to [facility] pharmacy. Patient's daughter contacted and aware they can  Rx after clinic appointment.

## 2018-11-16 LAB
ALBUMIN SERPL ELPH-MCNC: 3.9 G/DL (ref 3.7–5.1)
ALPHA1 GLOB SERPL ELPH-MCNC: 0.4 G/DL (ref 0.2–0.4)
ALPHA2 GLOB SERPL ELPH-MCNC: 0.9 G/DL (ref 0.5–0.9)
B-GLOBULIN SERPL ELPH-MCNC: 0.6 G/DL (ref 0.6–1)
COTININE SERPL-MCNC: NORMAL NG/ML
GAMMA GLOB SERPL ELPH-MCNC: 0.6 G/DL (ref 0.7–1.6)
KAPPA LC UR-MCNC: 0.98 MG/DL (ref 0.33–1.94)
KAPPA LC/LAMBDA SER: 0.52 {RATIO} (ref 0.26–1.65)
LAMBDA LC SERPL-MCNC: 1.88 MG/DL (ref 0.57–2.63)
M PROTEIN SERPL ELPH-MCNC: 0 G/DL
NICOTINE SERPL-MCNC: NORMAL NG/ML
PROT PATTERN SERPL ELPH-IMP: ABNORMAL
TPMT BLD-CCNC: 28.7 U/ML (ref 24–44)

## 2018-11-19 LAB
DLCOCOR-%PRED-PRE: 48 %
DLCOCOR-PRE: 9.43 ML/MIN/MMHG
DLCOUNC-%PRED-PRE: 45 %
DLCOUNC-PRE: 8.8 ML/MIN/MMHG
DLCOUNC-PRED: 19.31 ML/MIN/MMHG
ERV-%PRED-PRE: 38 %
ERV-PRE: 0.27 L
ERV-PRED: 0.69 L
EXPTIME-PRE: 12.45 SEC
FEF2575-%PRED-PRE: 10 %
FEF2575-PRE: 0.18 L/SEC
FEF2575-PRED: 1.75 L/SEC
FEFMAX-%PRED-PRE: 38 %
FEFMAX-PRE: 2.01 L/SEC
FEFMAX-PRED: 5.2 L/SEC
FEV1-%PRED-PRE: 27 %
FEV1-PRE: 0.55 L
FEV1FEV6-PRE: 46 %
FEV1FEV6-PRED: 79 %
FEV1FVC-PRE: 40 %
FEV1FVC-PRED: 76 %
FEV1SVC-PRE: 36 %
FEV1SVC-PRED: 75 %
FIFMAX-PRE: 3.2 L/SEC
FRCPLETH-%PRED-PRE: 170 %
FRCPLETH-PRE: 4.34 L
FRCPLETH-PRED: 2.54 L
FVC-%PRED-PRE: 54 %
FVC-PRE: 1.39 L
FVC-PRED: 2.56 L
IC-%PRED-PRE: 62 %
IC-PRE: 1.25 L
IC-PRED: 1.99 L
RVPLETH-%PRED-PRE: 211 %
RVPLETH-PRE: 4.07 L
RVPLETH-PRED: 1.92 L
TLCPLETH-%PRED-PRE: 125 %
TLCPLETH-PRE: 5.58 L
TLCPLETH-PRED: 4.44 L
VA-%PRED-PRE: 66 %
VA-PRE: 3.02 L
VC-%PRED-PRE: 56 %
VC-PRE: 1.51 L
VC-PRED: 2.68 L

## 2019-01-11 NOTE — ADDENDUM NOTE
Encounter addended by: Suha Benavidez on: 1/11/2019 4:12 PM   Actions taken: Imaging Exam begun, Allergies reviewed

## 2019-02-05 ENCOUNTER — TELEPHONE (OUTPATIENT)
Dept: TRANSPLANT | Facility: CLINIC | Age: 69
End: 2019-02-05

## 2019-02-06 NOTE — TELEPHONE ENCOUNTER
Received voice message from patient's daughter inquiring about scheduling patient with a general pulmonologist at [facility]. Sent message to Dr Schwartz inquiring if she is taking new patients and if she could see patient at [facility].     Message left with patient and her daughter Bhavna regarding plan.

## 2019-03-13 ENCOUNTER — TRANSFERRED RECORDS (OUTPATIENT)
Dept: HEALTH INFORMATION MANAGEMENT | Facility: CLINIC | Age: 69
End: 2019-03-13

## 2019-04-25 DIAGNOSIS — J44.9 COPD (CHRONIC OBSTRUCTIVE PULMONARY DISEASE) (H): Primary | ICD-10-CM

## 2019-04-25 NOTE — TELEPHONE ENCOUNTER
RECORDS RECEIVED FROM:internal   DATE RECEIVED: 5.13.19   NOTES STATUS DETAILS   OFFICE NOTE from referring provider N/A    OFFICE NOTE from other specialist N/A    DISCHARGE SUMMARY from hospital N/A    DISCHARGE REPORT from the ER N/A    OPERATIVE REPORT N/A    MEDICATION LIST Internal    IMAGING  (NEED IMAGES AND REPORTS)     CT SCAN Internal 11.12.18 1.24.18   CHEST XRAY (CXR) Internal 11.12.18   TESTS     PULMONARY FUNCTION TESTING (PFT) In process sched 5.13..19   FLOW LOOP VOLUME (FVL) In process    CYSTIC FIBROSIS     CF SPUTUM CULTURE N/A       Action    Action Taken 4.25.19 Sent message to call center error in regards to no  Cxr.  Sent message to clinical staff to place cxr and schedule appt.

## 2019-05-13 ENCOUNTER — OFFICE VISIT (OUTPATIENT)
Dept: PULMONOLOGY | Facility: CLINIC | Age: 69
End: 2019-05-13
Attending: INTERNAL MEDICINE
Payer: MEDICARE

## 2019-05-13 ENCOUNTER — CARE COORDINATION (OUTPATIENT)
Dept: PULMONOLOGY | Facility: CLINIC | Age: 69
End: 2019-05-13

## 2019-05-13 ENCOUNTER — PRE VISIT (OUTPATIENT)
Dept: PULMONOLOGY | Facility: CLINIC | Age: 69
End: 2019-05-13

## 2019-05-13 ENCOUNTER — ANCILLARY PROCEDURE (OUTPATIENT)
Dept: GENERAL RADIOLOGY | Facility: CLINIC | Age: 69
End: 2019-05-13
Attending: INTERNAL MEDICINE
Payer: MEDICARE

## 2019-05-13 VITALS
SYSTOLIC BLOOD PRESSURE: 113 MMHG | HEART RATE: 105 BPM | HEIGHT: 62 IN | BODY MASS INDEX: 24.78 KG/M2 | DIASTOLIC BLOOD PRESSURE: 69 MMHG | WEIGHT: 134.63 LBS | OXYGEN SATURATION: 99 %

## 2019-05-13 DIAGNOSIS — J44.9 COPD (CHRONIC OBSTRUCTIVE PULMONARY DISEASE) (H): ICD-10-CM

## 2019-05-13 DIAGNOSIS — R91.8 PULMONARY NODULES: ICD-10-CM

## 2019-05-13 DIAGNOSIS — J43.2 CENTRILOBULAR EMPHYSEMA (H): Primary | ICD-10-CM

## 2019-05-13 PROCEDURE — G0009 ADMIN PNEUMOCOCCAL VACCINE: HCPCS | Mod: ZF

## 2019-05-13 PROCEDURE — 90732 PPSV23 VACC 2 YRS+ SUBQ/IM: CPT | Mod: ZF | Performed by: INTERNAL MEDICINE

## 2019-05-13 PROCEDURE — G0463 HOSPITAL OUTPT CLINIC VISIT: HCPCS | Mod: ZF

## 2019-05-13 PROCEDURE — 25000128 H RX IP 250 OP 636: Mod: ZF | Performed by: INTERNAL MEDICINE

## 2019-05-13 RX ORDER — AZITHROMYCIN 250 MG/1
250 TABLET, FILM COATED ORAL
Qty: 30 TABLET | Refills: 3 | Status: SHIPPED | OUTPATIENT
Start: 2019-05-13

## 2019-05-13 RX ADMIN — PNEUMOCOCCAL VACCINE POLYVALENT 0.5 ML
25; 25; 25; 25; 25; 25; 25; 25; 25; 25; 25; 25; 25; 25; 25; 25; 25; 25; 25; 25; 25; 25; 25 INJECTION, SOLUTION INTRAMUSCULAR; SUBCUTANEOUS at 10:14

## 2019-05-13 ASSESSMENT — MIFFLIN-ST. JEOR: SCORE: 1083.93

## 2019-05-13 ASSESSMENT — PAIN SCALES - GENERAL: PAINLEVEL: NO PAIN (0)

## 2019-05-13 NOTE — PROGRESS NOTES
Pulmonary Clinic New Patient Consult  Reason for Consult: COPD  History of Present Illness    Ms. Chantal Bernal is a 68-year-old female with a past history of severe COPD who presents to pulmonary clinic today to establish care for COPD.  She is previously been seen by my colleagues and lung transplant clinic and was ultimately declined to proceed with transplant evaluation due to impaired renal function.  Most recent CT from November 2018 was notable for diffuse severe emphysematous changes that were upper lobe predominant as well as multiple scattered pulmonary nodules, the largest measuring 5 mm which was stable dating back to January 2018.    Ms. Bernal presents to clinic today accompanied by her daughters.  Recently she states her breathing has been poor.  She wears 2 L of oxygen usually but occasionally goes up to 4 L with exertion.  Her current inhaler regimen includes duo nebs 3-4 times a day, Pulmicort twice daily, albuterol inhaler for rescue anywhere from 0-5 times a day depending on the day, montelukast daily, and 5 mg of prednisone daily.  She has been on chronic prednisone for the last 3 to 4 years.  She also has albuterol nebulizers available for as needed use and she believes she has needed to use these twice in the last 2 weeks.  She states that her breathing is worse right now which she relates to her allergies which are also bad.  She has been taking Zyrtec as needed for this.  Her last hospitalization for a respiratory issue was in December-January.  Apparently this hospitalization started out with a GI bug causing dehydration.  As they describe it, she subsequently became fluid overloaded causing additional deterioration in respiratory status.  She spent 9 days in the hospital at this time.  She has been working with physical therapy since discharge.  She has good days and bad days in terms of her walking and functional capacity.  Her last prednisone burst was a few weeks ago.  At baseline she  has a cough which is productive of white phlegm.  She denies any fevers, chills, or hemoptysis.    She is a previous smoker of 1+ pack per day for 40 years.  She quit smoking in .      Review of Systems:  10 of 14 systems reviewed and are negative unless otherwise stated in HPI.    Past Medical History:   Diagnosis Date     Anemia      COPD, severe (H)      Elevated serum creatinine      Hypervitaminosis D      Osteoporosis      Osteoporosis      Pulmonary nodules      Renal cyst        Past Surgical History:   Procedure Laterality Date     CHOLECYSTECTOMY       OTHER SURGICAL HISTORY      Stated had a hysterectomy at [facility] around .       Family History   Problem Relation Age of Onset     Cancer Brother         throat     Lung Cancer Paternal Uncle        Social History     Socioeconomic History     Marital status: Single     Spouse name: None     Number of children: None     Years of education: None     Highest education level: None   Occupational History     None   Social Needs     Financial resource strain: None     Food insecurity:     Worry: None     Inability: None     Transportation needs:     Medical: None     Non-medical: None   Tobacco Use     Smoking status: Former Smoker     Packs/day: 1.00     Years: 47.00     Pack years: 47.00     Types: Cigarettes     Last attempt to quit: 2012     Years since quittin.3     Smokeless tobacco: Never Used   Substance and Sexual Activity     Alcohol use: Yes     Comment: minimal     Drug use: No     Sexual activity: None   Lifestyle     Physical activity:     Days per week: None     Minutes per session: None     Stress: None   Relationships     Social connections:     Talks on phone: None     Gets together: None     Attends Congregation service: None     Active member of club or organization: None     Attends meetings of clubs or organizations: None     Relationship status: None     Intimate partner violence:     Fear of current or ex partner: None      Emotionally abused: None     Physically abused: None     Forced sexual activity: None   Other Topics Concern     Parent/sibling w/ CABG, MI or angioplasty before 65F 55M? Not Asked   Social History Narrative     None         Allergies   Allergen Reactions     Arformoterol Nausea and Vomiting and Shortness Of Breath     Codeine Unknown and Shortness Of Breath     Trouble breathing     Morphine GI Disturbance         Current Outpatient Medications:      acetaminophen (TYLENOL) 500 MG tablet, , Disp: , Rfl:      albuterol (2.5 MG/3ML) 0.083% neb solution, TAKE 3 ML IN NEBULIZER EVERY FOUR HOURS AS NEEDED FOR SHORTNESS OF BREATH, Disp: , Rfl:      aspirin 81 MG tablet, Take 1 tablet (81 mg) by mouth daily, Disp: 30 tablet, Rfl: 11     azithromycin (ZITHROMAX) 250 MG tablet, Take 1 tablet (250 mg) by mouth Every Mon, Wed, Fri Morning, Disp: 30 tablet, Rfl: 3     azithromycin (ZITHROMAX) 500 MG tablet, , Disp: , Rfl: 4     budesonide (PULMICORT) 1 MG/2ML SUSP neb solution, Inhale 1 mg into the lungs, Disp: , Rfl:      buPROPion (WELLBUTRIN XL) 300 MG 24 hr tablet, Take 300 mg by mouth, Disp: , Rfl:      furosemide (LASIX) 20 MG tablet, Take 20 mg by mouth, Disp: , Rfl:      LORazepam (ATIVAN) 0.5 MG tablet, as needed, Disp: , Rfl:      losartan (COZAAR) 50 MG tablet, Take 50 mg by mouth, Disp: , Rfl:      metoprolol succinate (TOPROL-XL) 50 MG 24 hr tablet, TAKE 1/2 TABLET BY MOUTH TWO TIMES A DAY, Disp: , Rfl:      montelukast (SINGULAIR) 10 MG tablet, Take 10 mg by mouth, Disp: , Rfl:      morphine (MSIR) 15 MG IR tablet, as needed, Disp: , Rfl:      predniSONE (DELTASONE) 5 MG tablet, TAKE 1 TAB BY MOUTH ONE TIME A DAY. TAKE DAILY WITH FOOD.  INDICATIONS: CHRONIC OBSTRUCTIVE LUNG DISEASE, TO START WHEN DONE WITH TAPER, Disp: , Rfl:      VENTOLIN  (90 Base) MCG/ACT Inhaler, , Disp: , Rfl: 0  No current facility-administered medications for this visit.       Physical Exam:  /69   Pulse 105   Ht 1.575 m  "(5' 2\")   Wt 61.1 kg (134 lb 10.1 oz)   SpO2 99%   BMI 24.62 kg/m    GENERAL: Well developed, alert, and in no apparent distress.  HEENT: Normocephalic, atraumatic. PERRL, EOMI. Oral mucosa is moist. No perioral cyanosis.  NECK: supple, no masses, no thyromegaly.  RESP:  Normal respiratory effort.  Diminished breath sounds bilaterally.  No rales, wheezes, rhonchi.  No cyanosis or clubbing.  CV: Normal S1, S2, regular rhythm, normal rate. No murmur.  No LE edema.   ABDOMEN:  Soft, non-tender, non-distended.   SKIN: warm and dry. No rash.  NEURO: AAOx3.  No focal neuro deficits.  PSYCH: mentation appears normal. and affect normal/bright    Results:  PFTs obtained from OSH: Ratio 38%, FVC 55%, FEV1 29%, DLCO 41%.  Study demonstrates a very severe obstructive ventilatory defect with impairment in gas exchange.  There is significant response to inhaled bronchodilator therapy (+12).  When compared to previous pulmonary function testing there does not appear to have been any change in pulmonary function.  Imaging (personally reviewed in clinic today):  CXR today:  1. No acute cardiopulmonary disease.  2. Redemonstration of emphysematous changes of the lungs.      Assessment and Plan:   Chantal Bernal is a 68 year old female with a history of severe COPD who presents to pulmonary clinic today to establish care for COPD.  She was previously followed by a pulmonologist outside of our system.  She also underwent lung transplant evaluation here and was declined, largely due to poor renal function.   1) COPD.  Her current regimen includes Duonebs, Pulmicort, Prednisone 5 mg daily and Singulair.  She has albuterol MDI and nebs available as needed for rescue.  She cannot tolerate LABA due to nausea, vomiting and shortness of breath.  She is currently on maximal inhaler therapy and continues to be limited in terms of daily symptoms and has recently had 2 AECOPD requiring higher doses of steroids.  Based on this, she warrants " escalation in therapy.  We discussed the addition of chronic azithromycin, 250 mg MWF to her regimen and she is agreeable to this.  We will plan to obtain EKG for baseline QTc monitoring.  If further escalation is needed in the future, we could also consider addition of roflumilast to her regimen.  Significant response to inhaled bronchodilators on PFTs and presence of allergies raises the question of possible asthmatic component to COPD. If needed in the future, we could also consider evaluating this further with checking IgE or eosinophils.   2) Poor functional status and likely deconditioning.  It sounds like she is currently getting PT +/- pulmonary rehab locally.  She was encouraged to continue in these programs.  We also briefly dicussed the possibility of LVRS as a means to improve her functional capacity.  She would potentially be interested in this, if deemed a candidate. I will reach out to my colleagues in  and ask them to review her case.  3) Allergic Rhinitis.  Encouraged to continue taking Zyrtec as needed.  Would also benefit from the addition of Flonase; this was recommended today.  4) Lung Cancer screening.  She is a candidate based on age and smoking history.  Given the severity of her underlying lung disease, she would likely be high risk for biopsy even if a suspicious pulmonary nodule were found.  The risks and benefits of proceeding with screening, the the acknowledged limitations of her severe emphysema were discussed.  She would like to go forward with screening.  She would be due for repeat CT in November 2019.  5) Chronic hypoxic respiratory failure.  Continue supplemental O2.  This patient requires a portable oxygen concentrator. This patient is not homebound and is mobile within the home. Additionally this patient requires oxygen for exercise, and for frequent travel to and from clinic appointments. The patient also utilizes oxygen for air travel, and portable tanks cannot be utilized  on the airplane.  Questions and concerns were answered to the patient's satisfaction.  she was provided with my contact information should new questions or concerns arise in the interim.  she should return to clinic in 6 months with CT chest.    Mera Schwartz MD  Pulmonary and Critical Care Medicine    The above note was dictated using voice recognition software and may include typographical errors. Please contact the author for any clarifications.    I spent a total of 60 minutes face to face with Chantal Bernal during today's office visit. Over 50% of this time was spent counseling the patient and/or coordinating care regarding their pulmonary disease.

## 2019-05-13 NOTE — NURSING NOTE
Chief Complaint   Patient presents with     New Patient     copd     Vitals were taken and medications were reconciled.     AAMIR Guallpa

## 2019-05-13 NOTE — LETTER
5/13/2019       RE: Chantal Bernal  [address]  [place] [address]     Dear Colleague,    Thank you for referring your patient, Chantal Bernal, to the [facility] at [facility]. Please see a copy of my visit note below.    Pulmonary Clinic New Patient Consult  Reason for Consult: COPD  History of Present Illness    Ms. Chantal Bernal is a 68-year-old female with a past history of severe COPD who presents to pulmonary clinic today to establish care for COPD.  She is previously been seen by my colleagues and lung transplant clinic and was ultimately declined to proceed with transplant evaluation due to impaired renal function.  Most recent CT from November 2018 was notable for diffuse severe emphysematous changes that were upper lobe predominant as well as multiple scattered pulmonary nodules, the largest measuring 5 mm which was stable dating back to January 2018.    Ms. Bernal presents to clinic today accompanied by her daughters.  Recently she states her breathing has been poor.  She wears 2 L of oxygen usually but occasionally goes up to 4 L with exertion.  Her current inhaler regimen includes duo nebs 3-4 times a day, Pulmicort twice daily, albuterol inhaler for rescue anywhere from 0-5 times a day depending on the day, montelukast daily, and 5 mg of prednisone daily.  She has been on chronic prednisone for the last 3 to 4 years.  She also has albuterol nebulizers available for as needed use and she believes she has needed to use these twice in the last 2 weeks.  She states that her breathing is worse right now which she relates to her allergies which are also bad.  She has been taking Zyrtec as needed for this.  Her last hospitalization for a respiratory issue was in December-January.  Apparently this hospitalization started out with a GI bug causing dehydration.  As they describe it, she subsequently became fluid overloaded causing  additional deterioration in respiratory status.  She spent 9 days in the hospital at this time.  She has been working with physical therapy since discharge.  She has good days and bad days in terms of her walking and functional capacity.  Her last prednisone burst was a few weeks ago.  At baseline she has a cough which is productive of white phlegm.  She denies any fevers, chills, or hemoptysis.    She is a previous smoker of 1+ pack per day for 40 years.  She quit smoking in .      Review of Systems:  10 of 14 systems reviewed and are negative unless otherwise stated in HPI.    Past Medical History:   Diagnosis Date     Anemia      COPD, severe (H)      Elevated serum creatinine      Hypervitaminosis D      Osteoporosis      Osteoporosis      Pulmonary nodules      Renal cyst        Past Surgical History:   Procedure Laterality Date     CHOLECYSTECTOMY       OTHER SURGICAL HISTORY      Stated had a hysterectomy at [facility] around .       Family History   Problem Relation Age of Onset     Cancer Brother         throat     Lung Cancer Paternal Uncle        Social History     Socioeconomic History     Marital status: Single     Spouse name: None     Number of children: None     Years of education: None     Highest education level: None   Occupational History     None   Social Needs     Financial resource strain: None     Food insecurity:     Worry: None     Inability: None     Transportation needs:     Medical: None     Non-medical: None   Tobacco Use     Smoking status: Former Smoker     Packs/day: 1.00     Years: 47.00     Pack years: 47.00     Types: Cigarettes     Last attempt to quit: 2012     Years since quittin.3     Smokeless tobacco: Never Used   Substance and Sexual Activity     Alcohol use: Yes     Comment: minimal     Drug use: No     Sexual activity: None   Lifestyle     Physical activity:     Days per week: None     Minutes per session: None     Stress: None   Relationships      Social connections:     Talks on phone: None     Gets together: None     Attends Worship service: None     Active member of club or organization: None     Attends meetings of clubs or organizations: None     Relationship status: None     Intimate partner violence:     Fear of current or ex partner: None     Emotionally abused: None     Physically abused: None     Forced sexual activity: None   Other Topics Concern     Parent/sibling w/ CABG, MI or angioplasty before 65F 55M? Not Asked   Social History Narrative     None         Allergies   Allergen Reactions     Arformoterol Nausea and Vomiting and Shortness Of Breath     Codeine Unknown and Shortness Of Breath     Trouble breathing     Morphine GI Disturbance         Current Outpatient Medications:      acetaminophen (TYLENOL) 500 MG tablet, , Disp: , Rfl:      albuterol (2.5 MG/3ML) 0.083% neb solution, TAKE 3 ML IN NEBULIZER EVERY FOUR HOURS AS NEEDED FOR SHORTNESS OF BREATH, Disp: , Rfl:      aspirin 81 MG tablet, Take 1 tablet (81 mg) by mouth daily, Disp: 30 tablet, Rfl: 11     azithromycin (ZITHROMAX) 250 MG tablet, Take 1 tablet (250 mg) by mouth Every Mon, Wed, Fri Morning, Disp: 30 tablet, Rfl: 3     azithromycin (ZITHROMAX) 500 MG tablet, , Disp: , Rfl: 4     budesonide (PULMICORT) 1 MG/2ML SUSP neb solution, Inhale 1 mg into the lungs, Disp: , Rfl:      buPROPion (WELLBUTRIN XL) 300 MG 24 hr tablet, Take 300 mg by mouth, Disp: , Rfl:      furosemide (LASIX) 20 MG tablet, Take 20 mg by mouth, Disp: , Rfl:      LORazepam (ATIVAN) 0.5 MG tablet, as needed, Disp: , Rfl:      losartan (COZAAR) 50 MG tablet, Take 50 mg by mouth, Disp: , Rfl:      metoprolol succinate (TOPROL-XL) 50 MG 24 hr tablet, TAKE 1/2 TABLET BY MOUTH TWO TIMES A DAY, Disp: , Rfl:      montelukast (SINGULAIR) 10 MG tablet, Take 10 mg by mouth, Disp: , Rfl:      morphine (MSIR) 15 MG IR tablet, as needed, Disp: , Rfl:      predniSONE (DELTASONE) 5 MG tablet, TAKE 1 TAB BY MOUTH ONE TIME  "A DAY. TAKE DAILY WITH FOOD.  INDICATIONS: CHRONIC OBSTRUCTIVE LUNG DISEASE, TO START WHEN DONE WITH TAPER, Disp: , Rfl:      VENTOLIN  (90 Base) MCG/ACT Inhaler, , Disp: , Rfl: 0  No current facility-administered medications for this visit.       Physical Exam:  /69   Pulse 105   Ht 1.575 m (5' 2\")   Wt 61.1 kg (134 lb 10.1 oz)   SpO2 99%   BMI 24.62 kg/m     GENERAL: Well developed, alert, and in no apparent distress.  HEENT: Normocephalic, atraumatic. PERRL, EOMI. Oral mucosa is moist. No perioral cyanosis.  NECK: supple, no masses, no thyromegaly.  RESP:  Normal respiratory effort.  Diminished breath sounds bilaterally.  No rales, wheezes, rhonchi.  No cyanosis or clubbing.  CV: Normal S1, S2, regular rhythm, normal rate. No murmur.  No LE edema.   ABDOMEN:  Soft, non-tender, non-distended.   SKIN: warm and dry. No rash.  NEURO: AAOx3.  No focal neuro deficits.  PSYCH: mentation appears normal. and affect normal/bright    Results:  PFTs obtained from OSH: Ratio 38%, FVC 55%, FEV1 29%, DLCO 41%.  Study demonstrates a very severe obstructive ventilatory defect with impairment in gas exchange.  There is significant response to inhaled bronchodilator therapy (+12).  When compared to previous pulmonary function testing there does not appear to have been any change in pulmonary function.  Imaging (personally reviewed in clinic today):  CXR today:  1. No acute cardiopulmonary disease.  2. Redemonstration of emphysematous changes of the lungs.      Assessment and Plan:   Chantal Bernal is a 68 year old female with a history of severe COPD who presents to pulmonary clinic today to establish care for COPD.  She was previously followed by a pulmonologist outside of our system.  She also underwent lung transplant evaluation here and was declined, largely due to poor renal function.   1) COPD.  Her current regimen includes Duonebs, Pulmicort, Prednisone 5 mg daily and Singulair.  She has albuterol MDI and " nebs available as needed for rescue.  She cannot tolerate LABA due to nausea, vomiting and shortness of breath.  She is currently on maximal inhaler therapy and continues to be limited in terms of daily symptoms and has recently had 2 AECOPD requiring higher doses of steroids.  Based on this, she warrants escalation in therapy.  We discussed the addition of chronic azithromycin, 250 mg MWF to her regimen and she is agreeable to this.  We will plan to obtain EKG for baseline QTc monitoring.  If further escalation is needed in the future, we could also consider addition of roflumilast to her regimen.  Significant response to inhaled bronchodilators on PFTs and presence of allergies raises the question of possible asthmatic component to COPD. If needed in the future, we could also consider evaluating this further with checking IgE or eosinophils.   2) Poor functional status and likely deconditioning.  It sounds like she is currently getting PT +/- pulmonary rehab locally.  She was encouraged to continue in these programs.  We also briefly dicussed the possibility of LVRS as a means to improve her functional capacity.  She would potentially be interested in this, if deemed a candidate. I will reach out to my colleagues in  and ask them to review her case.  3) Allergic Rhinitis.  Encouraged to continue taking Zyrtec as needed.  Would also benefit from the addition of Flonase; this was recommended today.  4) Lung Cancer screening.  She is a candidate based on age and smoking history.  Given the severity of her underlying lung disease, she would likely be high risk for biopsy even if a suspicious pulmonary nodule were found.  The risks and benefits of proceeding with screening, the the acknowledged limitations of her severe emphysema were discussed.  She would like to go forward with screening.  She would be due for repeat CT in November 2019.  5) Chronic hypoxic respiratory failure.  Continue supplemental O2.  This  patient requires a portable oxygen concentrator. This patient is not homebound and is mobile within the home. Additionally this patient requires oxygen for exercise, and for frequent travel to and from clinic appointments. The patient also utilizes oxygen for air travel, and portable tanks cannot be utilized on the airplane.  Questions and concerns were answered to the patient's satisfaction.  she was provided with my contact information should new questions or concerns arise in the interim.  she should return to clinic in 6 months with CT chest.    Mera Schwartz MD  Pulmonary and Critical Care Medicine    The above note was dictated using voice recognition software and may include typographical errors. Please contact the author for any clarifications.    I spent a total of 60 minutes face to face with Chantal Bernal during today's office visit. Over 50% of this time was spent counseling the patient and/or coordinating care regarding their pulmonary disease.      Again, thank you for allowing me to participate in the care of your patient.      Sincerely,    Leta Schwartz MD

## 2019-05-13 NOTE — Clinical Note
Pt interested in portable concentrator.  Was unsure of last hallwalk and DME; please reach out to patient for further coordination.  She should have also had an EKG in clinic today for QTc monitoring before leaving but it appears she did not.  This will also need to be done locally.  An order for EKG is written already.  Let me know if questions.  Thanks. FLORESITA

## 2019-05-13 NOTE — PROGRESS NOTES
Contacted pt per Dr. Schwartz.  Pt interested in portable concentrator, her current DME is [facility] [employer], per her daughter Bhavna.  Advised that writer will reach out to DME to determine feasibility, pt is unsure of when she had last 6MWT.  Also advised daughter that pt should have also had an EKG in clinic today for QTc monitoring before leaving but it appears she did not.  Bhavna asked that order is faxed to [facility] in [place].  Order faxed as requested with instruction to return results to Dr. Schwartz.

## 2019-05-13 NOTE — PROGRESS NOTES
Returned call to pt, [facility] [place] not recognize [place].  Chantal states she is in process of switching, she will call us with new DME and request for new orders if needed.  No further questions at this time.

## 2019-05-15 DIAGNOSIS — J42 CHRONIC BRONCHITIS, UNSPECIFIED CHRONIC BRONCHITIS TYPE (H): Primary | ICD-10-CM

## 2019-05-17 NOTE — TELEPHONE ENCOUNTER
RECORDS STATUS - ALL OTHER DIAGNOSIS      RECORDS RECEIVED FROM: [facility]   DATE RECEIVED: 5/17/19   NOTES STATUS DETAILS   OFFICE NOTE from referring provider  Chart notes in Epic   OFFICE NOTE from medical oncologist  JOSIAH/Jose G   DISCHARGE SUMMARY from hospital 6/15/19 CE-Kunal/Shannan   DISCHARGE REPORT from the ER     OPERATIVE REPORT     MEDICATION LIST 5/13/19 [facility]   CLINICAL TRIAL TREATMENTS TO DATE     LABS     PATHOLOGY REPORTS NA    ANYTHING RELATED TO DIAGNOSIS  Epic   GENONOMIC TESTING     TYPE:     IMAGING (NEED IMAGES & REPORT)     XR 5/13/19 PACS   CT SCANS Scheduled prior to appt 5/28 11/12/18 PACS   MRI     MAMMO     ULTRASOUND     PET

## 2019-05-28 ENCOUNTER — PRE VISIT (OUTPATIENT)
Dept: PULMONOLOGY | Facility: CLINIC | Age: 69
End: 2019-05-28

## 2019-05-29 DIAGNOSIS — J43.2 CENTRILOBULAR EMPHYSEMA (H): Primary | ICD-10-CM

## 2019-05-29 RX ORDER — ROFLUMILAST 250 UG/1
250 TABLET ORAL DAILY
Qty: 30 TABLET | Refills: 0 | Status: SHIPPED | OUTPATIENT
Start: 2019-05-29 | End: 2020-01-01

## 2019-05-29 NOTE — TELEPHONE ENCOUNTER
Pt's daughter called & rescheduled the appointment with Dr Miller from 5/28/19 to 7/2/19 at 1:40pm - (CT prior at 12:40pm)

## 2019-07-02 ENCOUNTER — OFFICE VISIT (OUTPATIENT)
Dept: PULMONOLOGY | Facility: CLINIC | Age: 69
End: 2019-07-02
Attending: INTERNAL MEDICINE
Payer: MEDICARE

## 2019-07-02 ENCOUNTER — ANCILLARY PROCEDURE (OUTPATIENT)
Dept: CT IMAGING | Facility: CLINIC | Age: 69
End: 2019-07-02
Attending: CLINICAL NURSE SPECIALIST
Payer: MEDICARE

## 2019-07-02 VITALS
SYSTOLIC BLOOD PRESSURE: 125 MMHG | HEART RATE: 101 BPM | OXYGEN SATURATION: 94 % | BODY MASS INDEX: 24.51 KG/M2 | WEIGHT: 134 LBS | DIASTOLIC BLOOD PRESSURE: 67 MMHG

## 2019-07-02 DIAGNOSIS — J42 CHRONIC BRONCHITIS, UNSPECIFIED CHRONIC BRONCHITIS TYPE (H): ICD-10-CM

## 2019-07-02 DIAGNOSIS — R91.8 PULMONARY NODULES: Primary | ICD-10-CM

## 2019-07-02 PROCEDURE — G0463 HOSPITAL OUTPT CLINIC VISIT: HCPCS | Mod: ZF

## 2019-07-02 ASSESSMENT — PAIN SCALES - GENERAL: PAINLEVEL: NO PAIN (0)

## 2019-07-02 NOTE — LETTER
7/2/2019       RE: Chantal Bernal  [address]  [facility] [address]     Dear Colleague,    Thank you for referring your patient, Chantal Bernal, to the [facility] at [facility]. Please see a copy of my visit note below.    LUNG NODULE & INTERVENTIONAL PULMONARY CLINIC  CLINICS & SURGERY [facility], [facility]     Chantal Bernal MRN# [id number]   Age: 68 year old YOB: 1950     Reason for Consultation: lung nodule(s) and BLVR    Requesting Physician: Leta Schwartz MD  [address] [address]       Assessment and Plan:    1. BLVR. Discussed procedure and qualifications in detail. Currently she does meet the criteria for pursuit of fissural integrity. We discussed that our radiology dept is still in the process of updating their software to assess the fissures. Once this is complete, we will send the images to [facility] facility to obtain fissure score. If she is a candidate at that point, we will need to work on insurance reimbursement. We also discussed a quicker option if she chooses, to get the procedure done at [facility] as well. I will keep her pulmonologist in the loop.     2. Established multiple pulmonary lung nodule(s). Given the characteristics on current/previous imaging and risk factors; I would classify this to be Intermediate (6-65%) risk for cancer. Has had sub6mm nodules stable since 2018. Cont annual lung cancer screening    Billing: The patient was seen and examined by me and the findings, assessment, and plan as documented was explained to the patient/family who expressed understand.     Bishop Miller MD   of Medicine  Interventional Pulmonology  Department of Pulmonary, Allergy, Critical Care and Sleep Medicine   Formerly [facility]  Pager: [phone]          History:     Chantal Bernal is a 68 year  EXAM note      History    Marjorie Harris is a 74 year old female who presents with chronic back pain. More than ten years of back pain.  She has had 2 MRI's in the past and was told she had herniated disks.   In the past she has had injections to the back before but unsure which type, and they did provide relief for a couple months.  She has tried physical therapy which made her pain worse.  She does not want to try any medications other than ibuprofen but it currently isn't working anymore.  Standing up straight is difficult and walking more than a block make the pain worse.  The pain is across the lower back without radiation to the legs, but  In the past she has had lower extremity pain.  Relieving factors for pain include using a shopping cart does, lidocaine patches, chiropractor.  Patient denies fever, chills, recent weight loss, saddle anaesthesia, gait disturbance, bowel or bladder incontinence/retention.    Review of systems        Constitutional:  Patient denies fever, chills, weakness or malaise.    Eyes:  Denies change in visual acuity, burning or double vision.    HENT:  Denies pain in the ears, nasal congestion, sore throat or difficulty swallowing.    Respiratory:  Denies cough, shortness of breath or pain with inspiration or expiration.    Cardiovascular:  Denies chest pain or palpitations.    GI:  Denies abdominal pain, nausea, vomiting, bloody stools, dark stools or diarrhea.    :  Denies painful urination, urinary frequency, blood in urine or urgency.    Musculoskeletal:  Denies leg swelling.    Integument:  Denies rash, itching.    Neurologic:  Denies headache.   Endocrine:  Denies polyuria, polydipsia or temperature intolerance.        medical history    No past medical history on file.    SURGICAL history    No past surgical history on file.    family history  No family history on file.    social history    Social History     Social History   • Marital status:      Spouse name: N/A    • Number of children: N/A   • Years of education: N/A     Social History Main Topics   • Smoking status: Current Some Day Smoker     Types: Cigarettes   • Smokeless tobacco: None   • Alcohol use None   • Drug use: None   • Sexual activity: Not Asked     Other Topics Concern   • None     Social History Narrative   • None           mEDICATIONS    No current outpatient prescriptions on file.     No current facility-administered medications for this visit.        aLLERGIES    ALLERGIES:   Allergen Reactions   • Codeine VISUAL DISTURBANCE   • Penicillins HIVES           Physical Exam    Vital Signs  Vitals:    02/06/17 0848   BP: 138/80   Pulse: 76   Resp: 12   Weight: 93.4 kg   Height: 5' 2\" (1.575 m)     Constitutional  Alert and oriented x3. No acute distress, appears stated age.  Body mass index is 37.68 kg/(m^2).    Integument  Warm. Dry. No erythema. No rash.    HENT  Normocephalic. Atraumatic.   Neck  Normal range of motion. Trachea is midline.  Eyes  PERRL, EOMI. Conjunctivae normal. No discharge.      Neurologic/Musculoskeletal  CN 2-12: grossly intact    Inspection/Palpation: tender to the L4-S1 paraspinal muscles    MMT: RUE D 5/5 B 5/5  WE 5/5  T 5/5  FF 5/5  HI 5/5   LUE D 5/5 B 5/5  WE 5/5  T 5/5  FF 5/5  HI 5/5   RLE HF 5/5 KE 5/5DF 5/5 EHL5/5 PF 5-/5    LLE  HF 5/5 KE 5/5DF 5/5 EHL5/5 PF 5/5     Sensation: grossly intact  0/4 deep tendon reflexes.  absent Babinski.  negative  Khan.    Facet loading positive  Straight Leg Test negative         Assessment  Low back pain   Spinal stenosis        Plan    Referral to Chiropractor.  No narcotics due to hallucinations and other side effects.   Xrays of the lumbar spine  Try tylenol with ibuprofen        Follow up: one month    Total time spent 30 minutes. Greater than 50% of this face to face visit was spent counseling regarding above issues.     old female with sig h/o for COPD who is here for evaluation/followup of BLVR.    - No new resp sx or complaints. Denies dyspnea or cough.   - Here for evaluation BLVR  - Personal hx of cancer: cervical cancer. Up-to-date on mammo and c-scope  - Family hx of cancer: Uncle had lung cancer  - Exposure hx: Denies asbestos or radon exposure   - Tobacco hx: Past Smoker: 1ppd for 47years. Quit   - My interpretation of the images relevant for this visit includes: nodules, emphysema   - My interpretation of the PFT's relevant for this visit includes: 3/2019. FEV1 0.53, DLCO 41    Culprit Nodule(s):   Multiple bilateral lung nodule(s) that are sub 6 mm. First seen by chest CT on 2018. There is no interval change    Other active medical problems include:   - has COPD and follows with Dr. Schwartz in pulmonary. On symbicort, spiriva and low dose azithro. Also uses pulmicort nebs and prednisone 5mg/d. Last AECOPD was January. Usually exacerbates 1-2 per year. Up-to-date on flu+PNA vaccination. Uses 2L/m O2 RTC.   - had cervical cancer s/p MONTRELL-BSO in            Past Medical History:      Past Medical History:   Diagnosis Date     Anemia      COPD, severe (H)      Elevated serum creatinine      Hypervitaminosis D      Osteoporosis      Osteoporosis      Pulmonary nodules      Renal cyst            Past Surgical History:      Past Surgical History:   Procedure Laterality Date     CHOLECYSTECTOMY       OTHER SURGICAL HISTORY      Stated had a hysterectomy at [facility] around .          Social History:     Social History     Tobacco Use     Smoking status: Former Smoker     Packs/day: 1.00     Years: 47.00     Pack years: 47.00     Types: Cigarettes     Last attempt to quit: 2012     Years since quittin.5     Smokeless tobacco: Never Used   Substance Use Topics     Alcohol use: Yes     Comment: minimal          Family History:     Family History   Problem Relation Age of Onset     Cancer Brother          throat     Lung Cancer Paternal Uncle            Allergies:      Allergies   Allergen Reactions     Arformoterol Nausea and Vomiting and Shortness Of Breath     Codeine Unknown and Shortness Of Breath     Trouble breathing     Morphine GI Disturbance          Medications:     Current Outpatient Medications   Medication Sig     acetaminophen (TYLENOL) 500 MG tablet      albuterol (2.5 MG/3ML) 0.083% neb solution TAKE 3 ML IN NEBULIZER EVERY FOUR HOURS AS NEEDED FOR SHORTNESS OF BREATH     aspirin 81 MG tablet Take 1 tablet (81 mg) by mouth daily     azithromycin (ZITHROMAX) 250 MG tablet Take 1 tablet (250 mg) by mouth Every Mon, Wed, Fri Morning     azithromycin (ZITHROMAX) 500 MG tablet      budesonide (PULMICORT) 1 MG/2ML SUSP neb solution Inhale 1 mg into the lungs     buPROPion (WELLBUTRIN XL) 300 MG 24 hr tablet Take 300 mg by mouth     furosemide (LASIX) 20 MG tablet Take 20 mg by mouth     LORazepam (ATIVAN) 0.5 MG tablet as needed     losartan (COZAAR) 50 MG tablet Take 50 mg by mouth     metoprolol succinate (TOPROL-XL) 50 MG 24 hr tablet TAKE 1/2 TABLET BY MOUTH TWO TIMES A DAY     montelukast (SINGULAIR) 10 MG tablet Take 10 mg by mouth     morphine (MSIR) 15 MG IR tablet as needed     predniSONE (DELTASONE) 5 MG tablet TAKE 1 TAB BY MOUTH ONE TIME A DAY. TAKE DAILY WITH FOOD.  INDICATIONS: CHRONIC OBSTRUCTIVE LUNG DISEASE, TO START WHEN DONE WITH TAPER     Roflumilast 250 MCG TABS Take 1 tablet (250 mcg) by mouth daily     VENTOLIN  (90 Base) MCG/ACT Inhaler      No current facility-administered medications for this visit.           Review of Systems:     CONSTITUTIONAL: negative for fever, chills, change in weight  INTEGUMENTARY/SKIN: no rash or obvious new lesions  ENT/MOUTH: no sore throat, new sinus pain or nasal drainage  RESP: see interval history  CV: negative for chest pain, palpitations or peripheral edema  GI: no nausea, vomiting, change in stools  : no dysuria  MUSCULOSKELETAL: no  myalgias, arthralgias  ENDOCRINE: blood sugars with adequate control  PSYCHIATRIC: mood stable  LYMPHATIC: no new lymphadenopathy  HEME: no bleeding or easy bruisability  NEURO: no numbness, weakness, headaches         Physical Exam:     Pulse:  [101] 101  BP: (125)/(67) 125/67  SpO2:  [94 %] 94 %  Wt Readings from Last 4 Encounters:   07/02/19 60.8 kg (134 lb)   05/13/19 61.1 kg (134 lb 10.1 oz)   11/15/18 56.8 kg (125 lb 4.8 oz)   11/12/18 56.2 kg (124 lb)     Constitutional:   Awake, alert and in no apparent distress     Eyes:   Nonicteric, BROOKE     ENT:    Trachea is midline. No gross neck abnormalities      Neck:   Supple without supraclavicular or cervical lymphadenopathy     Lungs:   Good air flow.  No crackles. No rhonchi.  No wheezes.     Cardiovascular:   Normal S1 and S2.  RRR.  No murmur, gallop or rub.  Radial, DP and PT pulses normal and symmetric     Abdomen:   NABS, soft, nontender, nondistended.  No HSM.     Musculoskeletal:   No edema.      Neurologic:   Alert and conversant. Cranial nerves  intact.       Skin:   Warm, dry.  No rash on limited exam.           Current Laboratory Data:   All laboratory and imaging data reviewed.    No results found for this or any previous visit (from the past 24 hour(s)).         Previous Pulmonary Function Testing     FVC-Pred   Date Value Ref Range Status   11/13/2018 2.56 L      FVC-Pre   Date Value Ref Range Status   11/13/2018 1.39 L      FVC-%Pred-Pre   Date Value Ref Range Status   11/13/2018 54 %      FEV1-Pre   Date Value Ref Range Status   11/13/2018 0.55 L      FEV1-%Pred-Pre   Date Value Ref Range Status   11/13/2018 27 %      FEV1FVC-Pred   Date Value Ref Range Status   11/13/2018 76 %      FEV1FVC-Pre   Date Value Ref Range Status   11/13/2018 40 %      No results found for: 20029  FEFMax-Pred   Date Value Ref Range Status   11/13/2018 5.20 L/sec      FEFMax-Pre   Date Value Ref Range Status   11/13/2018 2.01 L/sec      FEFMax-%Pred-Pre   Date Value  Ref Range Status   2018 38 %      ExpTime-Pre   Date Value Ref Range Status   2018 12.45 sec      FIFMax-Pre   Date Value Ref Range Status   2018 3.20 L/sec      FEV1FEV6-Pred   Date Value Ref Range Status   2018 79 %      FEV1FEV6-Pre   Date Value Ref Range Status   2018 46 %             Previous Cardiology Imaging     Recent Results (from the past 8760 hour(s))   Echo Complete Bubble Study With Optison    Narrative    [id number]  [id number]  [id number]  [id number]           [facility],[place]  Echocardiography Laboratory  [address] [place] [address]     Name: BRITTNY RAMIREZ  MRN: [id number]  : 1950  Study Date: 2018 01:38 PM  Age: 67 yrs  Gender: Female  Patient Location: [facility]  Reason For Study: COPD  History: COPD  Ordering Physician: JASMEET RAZO  Referring Physician: JASMEET RAZO  Performed By: Laurita Rizvi RDCS     BSA: 1.5 m2  Height: 61 in  Weight: 124 lb  BP: 129/79 mmHg  _____________________________________________________________________________  __        Procedure  Bubble Echocardiogram with two-dimensional, color and spectral Doppler  performed. Contrast Optison. Optison (NDC #4838-9451-60) given intravenously.  Patient was given 6 ml mixture of 3 ml Optison and 6 ml saline. 3 ml wasted.  _____________________________________________________________________________  __        Interpretation Summary  Global and regional left ventricular function is normal with an EF of 60-65%.  Right ventricular function, chamber size, wall motion, and thickness are  normal.  Mild to moderate aortic insufficiency is present.  The inferior vena cava was normal in size with preserved respiratory  variability.  Trivial pericardial effusion is present.     Previous study not available for comparison.  _____________________________________________________________________________  __        Left Ventricle  Global and  regional left ventricular function is normal with an EF of 60-65%.  Left ventricular wall thickness is normal. Left ventricular size is normal.  Left ventricular diastolic function is indeterminate. No regional wall motion  abnormalities are seen.     Right Ventricle  Right ventricular function, chamber size, wall motion, and thickness are  normal. TAPSE 1.8 cm.     Atria  The left atrium appears normal. The right atria appears normal. The atrial  septum is intact as assessed by color Doppler and agitated saline bubble  study .     Mitral Valve  The mitral valve is normal.        Aortic Valve  The aortic valve is tricuspid. Mild aortic valve sclerosis is present. Mild to  moderate aortic insufficiency is present.     Tricuspid Valve  The tricuspid valve is normal. The peak velocity of the tricuspid regurgitant  jet is not obtainable.     Pulmonic Valve  Trace pulmonic insufficiency is present.     Vessels  The aorta root is normal. The thoracic aorta is normal. Proximal aorta indexes  to normal for BSA. The pulmonary artery is normal. The inferior vena cava was  normal in size with preserved respiratory variability. Sinuses of Valsalva 3.3  cm. Ascending aorta 3.1 cm. Estimated mean right atrial pressure is 3 mmHg  (normal).     Pericardium  Trivial pericardial effusion is present.        Compared to Previous Study  Previous study not available for comparison.     Attestation  I have personally viewed the imaging and agree with the interpretation and  report as documented by the fellow, Fiorella Remy, and/or edited by me.  _____________________________________________________________________________  __     MMode/2D Measurements & Calculations  IVSd: 0.76 cm  LVIDd: 4.4 cm  LVIDs: 2.9 cm  LVPWd: 0.76 cm  FS: 34.2 %  LV mass(C)d: 104.1 grams  LV mass(C)dI: 67.5 grams/m2  Ao root diam: 3.3 cm  LA dimension: 3.3 cm  asc Aorta Diam: 3.1 cm  LA/Ao: 1.0  LVOT diam: 2.0 cm  LVOT area: 3.1 cm2  LA Volume (BP): 35.9 ml      LA Volume Index (BP): 23.3 ml/m2  RWT: 0.34        Doppler Measurements & Calculations  Ao V2 max: 146.0 cm/sec  Ao max P.0 mmHg  Ao V2 mean: 96.0 cm/sec  Ao mean P.0 mmHg  Ao V2 VTI: 25.3 cm  AR(I,D): 2.8 cm2  AR(V,D): 2.4 cm2  AI P1/2t: 356.0 msec  LV V1 max P.0 mmHg  LV V1 max: 112.0 cm/sec  LV V1 VTI: 22.6 cm  SV(LVOT): 71.0 ml  SI(LVOT): 46.1 ml/m2  PA acc time: 0.06 sec  AV Gen Ratio (DI): 0.77  AR Index (cm2/m2): 1.8        _____________________________________________________________________________  __        Report approved by: Stacy Castro 2018 04:04 PM                     Again, thank you for allowing me to participate in the care of your patient.      Sincerely,    Bishop Miller MD

## 2019-07-02 NOTE — PROGRESS NOTES
LUNG NODULE & INTERVENTIONAL PULMONARY CLINIC  [facility]     Chantal Bernal MRN# [id number]   Age: 68 year old YOB: 1950     Reason for Consultation: lung nodule(s) and BLVR    Requesting Physician: Leta Schwartz MD  [address] [address]       Assessment and Plan:    1. BLVR. Discussed procedure and qualifications in detail. Currently she does meet the criteria for pursuit of fissural integrity. We discussed that our radiology dept is still in the process of updating their software to assess the fissures. Once this is complete, we will send the images to [facility] facility to obtain fissure score. If she is a candidate at that point, we will need to work on insurance reimbursement. We also discussed a quicker option if she chooses, to get the procedure done at [facility] as well. I will keep her pulmonologist in the loop.     2. Established multiple pulmonary lung nodule(s). Given the characteristics on current/previous imaging and risk factors; I would classify this to be Intermediate (6-65%) risk for cancer. Has had sub6mm nodules stable since 2018. Cont annual lung cancer screening    Billing: The patient was seen and examined by me and the findings, assessment, and plan as documented was explained to the patient/family who expressed understand.     Bishop Miller MD   of Medicine  Interventional Pulmonology  Department of Pulmonary, Allergy, Critical Care and Sleep Medicine   [facility]  Pager: [phone]          History:     Chantal Bernal is a 68 year old female with sig h/o for COPD who is here for evaluation/followup of BLVR.    - No new resp sx or complaints. Denies dyspnea or cough.   - Here for evaluation BLVR  - Personal hx of cancer: cervical cancer. Up-to-date on mammo and c-scope  - Family hx of cancer: Uncle had lung  cancer  - Exposure hx: Denies asbestos or radon exposure   - Tobacco hx: Past Smoker: 1ppd for 47years. Quit   - My interpretation of the images relevant for this visit includes: nodules, emphysema   - My interpretation of the PFT's relevant for this visit includes: 3/2019. FEV1 0.53, DLCO 41    Culprit Nodule(s):   Multiple bilateral lung nodule(s) that are sub 6 mm. First seen by chest CT on 2018. There is no interval change    Other active medical problems include:   - has COPD and follows with Dr. Schwartz in pulmonary. On symbicort, spiriva and low dose azithro. Also uses pulmicort nebs and prednisone 5mg/d. Last AECOPD was January. Usually exacerbates 1-2 per year. Up-to-date on flu+PNA vaccination. Uses 2L/m O2 RTC.   - had cervical cancer s/p MONTRELL-BSO in            Past Medical History:      Past Medical History:   Diagnosis Date     Anemia      COPD, severe (H)      Elevated serum creatinine      Hypervitaminosis D      Osteoporosis      Osteoporosis      Pulmonary nodules      Renal cyst            Past Surgical History:      Past Surgical History:   Procedure Laterality Date     CHOLECYSTECTOMY       OTHER SURGICAL HISTORY      Stated had a hysterectomy at [facility] around .          Social History:     Social History     Tobacco Use     Smoking status: Former Smoker     Packs/day: 1.00     Years: 47.00     Pack years: 47.00     Types: Cigarettes     Last attempt to quit: 2012     Years since quittin.5     Smokeless tobacco: Never Used   Substance Use Topics     Alcohol use: Yes     Comment: minimal          Family History:     Family History   Problem Relation Age of Onset     Cancer Brother         throat     Lung Cancer Paternal Uncle            Allergies:      Allergies   Allergen Reactions     Arformoterol Nausea and Vomiting and Shortness Of Breath     Codeine Unknown and Shortness Of Breath     Trouble breathing     Morphine GI Disturbance          Medications:      Current Outpatient Medications   Medication Sig     acetaminophen (TYLENOL) 500 MG tablet      albuterol (2.5 MG/3ML) 0.083% neb solution TAKE 3 ML IN NEBULIZER EVERY FOUR HOURS AS NEEDED FOR SHORTNESS OF BREATH     aspirin 81 MG tablet Take 1 tablet (81 mg) by mouth daily     azithromycin (ZITHROMAX) 250 MG tablet Take 1 tablet (250 mg) by mouth Every Mon, Wed, Fri Morning     azithromycin (ZITHROMAX) 500 MG tablet      budesonide (PULMICORT) 1 MG/2ML SUSP neb solution Inhale 1 mg into the lungs     buPROPion (WELLBUTRIN XL) 300 MG 24 hr tablet Take 300 mg by mouth     furosemide (LASIX) 20 MG tablet Take 20 mg by mouth     LORazepam (ATIVAN) 0.5 MG tablet as needed     losartan (COZAAR) 50 MG tablet Take 50 mg by mouth     metoprolol succinate (TOPROL-XL) 50 MG 24 hr tablet TAKE 1/2 TABLET BY MOUTH TWO TIMES A DAY     montelukast (SINGULAIR) 10 MG tablet Take 10 mg by mouth     morphine (MSIR) 15 MG IR tablet as needed     predniSONE (DELTASONE) 5 MG tablet TAKE 1 TAB BY MOUTH ONE TIME A DAY. TAKE DAILY WITH FOOD.  INDICATIONS: CHRONIC OBSTRUCTIVE LUNG DISEASE, TO START WHEN DONE WITH TAPER     Roflumilast 250 MCG TABS Take 1 tablet (250 mcg) by mouth daily     VENTOLIN  (90 Base) MCG/ACT Inhaler      No current facility-administered medications for this visit.           Review of Systems:     CONSTITUTIONAL: negative for fever, chills, change in weight  INTEGUMENTARY/SKIN: no rash or obvious new lesions  ENT/MOUTH: no sore throat, new sinus pain or nasal drainage  RESP: see interval history  CV: negative for chest pain, palpitations or peripheral edema  GI: no nausea, vomiting, change in stools  : no dysuria  MUSCULOSKELETAL: no myalgias, arthralgias  ENDOCRINE: blood sugars with adequate control  PSYCHIATRIC: mood stable  LYMPHATIC: no new lymphadenopathy  HEME: no bleeding or easy bruisability  NEURO: no numbness, weakness, headaches         Physical Exam:     Pulse:  [101] 101  BP: (125)/(67)  125/67  SpO2:  [94 %] 94 %  Wt Readings from Last 4 Encounters:   07/02/19 60.8 kg (134 lb)   05/13/19 61.1 kg (134 lb 10.1 oz)   11/15/18 56.8 kg (125 lb 4.8 oz)   11/12/18 56.2 kg (124 lb)     Constitutional:   Awake, alert and in no apparent distress     Eyes:   Nonicteric, BROOKE     ENT:    Trachea is midline. No gross neck abnormalities      Neck:   Supple without supraclavicular or cervical lymphadenopathy     Lungs:   Good air flow.  No crackles. No rhonchi.  No wheezes.     Cardiovascular:   Normal S1 and S2.  RRR.  No murmur, gallop or rub.  Radial, DP and PT pulses normal and symmetric     Abdomen:   NABS, soft, nontender, nondistended.  No HSM.     Musculoskeletal:   No edema.      Neurologic:   Alert and conversant. Cranial nerves  intact.       Skin:   Warm, dry.  No rash on limited exam.           Current Laboratory Data:   All laboratory and imaging data reviewed.    No results found for this or any previous visit (from the past 24 hour(s)).         Previous Pulmonary Function Testing     FVC-Pred   Date Value Ref Range Status   11/13/2018 2.56 L      FVC-Pre   Date Value Ref Range Status   11/13/2018 1.39 L      FVC-%Pred-Pre   Date Value Ref Range Status   11/13/2018 54 %      FEV1-Pre   Date Value Ref Range Status   11/13/2018 0.55 L      FEV1-%Pred-Pre   Date Value Ref Range Status   11/13/2018 27 %      FEV1FVC-Pred   Date Value Ref Range Status   11/13/2018 76 %      FEV1FVC-Pre   Date Value Ref Range Status   11/13/2018 40 %      No results found for: 20029  FEFMax-Pred   Date Value Ref Range Status   11/13/2018 5.20 L/sec      FEFMax-Pre   Date Value Ref Range Status   11/13/2018 2.01 L/sec      FEFMax-%Pred-Pre   Date Value Ref Range Status   11/13/2018 38 %      ExpTime-Pre   Date Value Ref Range Status   11/13/2018 12.45 sec      FIFMax-Pre   Date Value Ref Range Status   11/13/2018 3.20 L/sec      FEV1FEV6-Pred   Date Value Ref Range Status   11/13/2018 79 %      FEV1FEV6-Pre   Date Value  Ref Range Status   2018 46 %             Previous Cardiology Imaging     Recent Results (from the past 8760 hour(s))   Echo Complete Bubble Study With Optison    Narrative    [id number]  [id number]  [id number]  [id number]^DUNG^JASMEET           [facility],[place]  Echocardiography Laboratory  [address] [place] [id number]     Name: BRITTNY RAMIREZ  MRN: [id number]  : 1950  Study Date: 2018 01:38 PM  Age: 67 yrs  Gender: Female  Patient Location: [facility]  Reason For Study: COPD  History: COPD  Ordering Physician: JASMEET RAZO  Referring Physician: JASMEET RAZO  Performed By: Laurita Rizvi RDCS     BSA: 1.5 m2  Height: 61 in  Weight: 124 lb  BP: 129/79 mmHg  _____________________________________________________________________________  __        Procedure  Bubble Echocardiogram with two-dimensional, color and spectral Doppler  performed. Contrast Optison. Optison (NDC #8396-8626-06) given intravenously.  Patient was given 6 ml mixture of 3 ml Optison and 6 ml saline. 3 ml wasted.  _____________________________________________________________________________  __        Interpretation Summary  Global and regional left ventricular function is normal with an EF of 60-65%.  Right ventricular function, chamber size, wall motion, and thickness are  normal.  Mild to moderate aortic insufficiency is present.  The inferior vena cava was normal in size with preserved respiratory  variability.  Trivial pericardial effusion is present.     Previous study not available for comparison.  _____________________________________________________________________________  __        Left Ventricle  Global and regional left ventricular function is normal with an EF of 60-65%.  Left ventricular wall thickness is normal. Left ventricular size is normal.  Left ventricular diastolic function is indeterminate. No regional wall motion  abnormalities are seen.     Right Ventricle  Right  ventricular function, chamber size, wall motion, and thickness are  normal. TAPSE 1.8 cm.     Atria  The left atrium appears normal. The right atria appears normal. The atrial  septum is intact as assessed by color Doppler and agitated saline bubble  study .     Mitral Valve  The mitral valve is normal.        Aortic Valve  The aortic valve is tricuspid. Mild aortic valve sclerosis is present. Mild to  moderate aortic insufficiency is present.     Tricuspid Valve  The tricuspid valve is normal. The peak velocity of the tricuspid regurgitant  jet is not obtainable.     Pulmonic Valve  Trace pulmonic insufficiency is present.     Vessels  The aorta root is normal. The thoracic aorta is normal. Proximal aorta indexes  to normal for BSA. The pulmonary artery is normal. The inferior vena cava was  normal in size with preserved respiratory variability. Sinuses of Valsalva 3.3  cm. Ascending aorta 3.1 cm. Estimated mean right atrial pressure is 3 mmHg  (normal).     Pericardium  Trivial pericardial effusion is present.        Compared to Previous Study  Previous study not available for comparison.     Attestation  I have personally viewed the imaging and agree with the interpretation and  report as documented by the fellow, Fiorella Remy, and/or edited by me.  _____________________________________________________________________________  __     MMode/2D Measurements & Calculations  IVSd: 0.76 cm  LVIDd: 4.4 cm  LVIDs: 2.9 cm  LVPWd: 0.76 cm  FS: 34.2 %  LV mass(C)d: 104.1 grams  LV mass(C)dI: 67.5 grams/m2  Ao root diam: 3.3 cm  LA dimension: 3.3 cm  asc Aorta Diam: 3.1 cm  LA/Ao: 1.0  LVOT diam: 2.0 cm  LVOT area: 3.1 cm2  LA Volume (BP): 35.9 ml     LA Volume Index (BP): 23.3 ml/m2  RWT: 0.34        Doppler Measurements & Calculations  Ao V2 max: 146.0 cm/sec  Ao max P.0 mmHg  Ao V2 mean: 96.0 cm/sec  Ao mean P.0 mmHg  Ao V2 VTI: 25.3 cm  AR(I,D): 2.8 cm2  AR(V,D): 2.4 cm2  AI P1/2t: 356.0 msec  LV V1 max PG:  5.0 mmHg  LV V1 max: 112.0 cm/sec  LV V1 VTI: 22.6 cm  SV(LVOT): 71.0 ml  SI(LVOT): 46.1 ml/m2  PA acc time: 0.06 sec  AV Gen Ratio (DI): 0.77  AR Index (cm2/m2): 1.8        _____________________________________________________________________________  __        Report approved by: Stacy Castro 11/12/2018 04:04 PM

## 2019-07-02 NOTE — NURSING NOTE
"Oncology Rooming Note    July 2, 2019 12:53 PM   Chantal Bernal is a 68 year old female who presents for:    Chief Complaint   Patient presents with     Consult     New- Lung Nodules     Initial Vitals: /67   Pulse 101   Wt 60.8 kg (134 lb)   SpO2 94%   BMI 24.51 kg/m   Estimated body mass index is 24.51 kg/m  as calculated from the following:    Height as of 5/13/19: 1.575 m (5' 2\").    Weight as of this encounter: 60.8 kg (134 lb). Body surface area is 1.63 meters squared.  No Pain (0) Comment: Data Unavailable   No LMP recorded. Patient has had a hysterectomy.  Allergies reviewed: Yes  Medications reviewed: Yes    Medications: Medication refills not needed today.  Pharmacy name entered into [facility]:    [facility] - [place], [place] - [id number] AT [facility] - [facility] [id number] [phone]    Clinical concerns: n/a       BRANDO RILEY CMA              "

## 2019-09-28 ENCOUNTER — HEALTH MAINTENANCE LETTER (OUTPATIENT)
Age: 69
End: 2019-09-28

## 2019-10-23 ENCOUNTER — TELEPHONE (OUTPATIENT)
Dept: PULMONOLOGY | Facility: CLINIC | Age: 69
End: 2019-10-23

## 2019-10-23 NOTE — TELEPHONE ENCOUNTER
[facility] Call Center    Phone Message    May a detailed message be left on voicemail: yes    Reason for Call: Other: Patient's daughter called to reschedule her apt with Dr. Jung. She wants her mom to have her PFT and  CT done in [employer]. Please let me know if this is ok.     Action Taken: Other: [facility]

## 2019-10-29 ENCOUNTER — MYC MEDICAL ADVICE (OUTPATIENT)
Dept: PULMONOLOGY | Facility: CLINIC | Age: 69
End: 2019-10-29

## 2019-10-29 NOTE — TELEPHONE ENCOUNTER
Bhavna returned call and agreed to have [place] done [place] [facility] [place] to appointment with Dr. Schwartz.  Message to coordinators to assist with scheduling.

## 2020-01-01 ENCOUNTER — VIRTUAL VISIT (OUTPATIENT)
Dept: PULMONOLOGY | Facility: CLINIC | Age: 70
End: 2020-01-01
Attending: INTERNAL MEDICINE
Payer: MEDICARE

## 2020-01-01 ENCOUNTER — TRANSFERRED RECORDS (OUTPATIENT)
Dept: HEALTH INFORMATION MANAGEMENT | Facility: CLINIC | Age: 70
End: 2020-01-01

## 2020-01-01 ENCOUNTER — TRANSCRIBE ORDERS (OUTPATIENT)
Dept: OTHER | Age: 70
End: 2020-01-01

## 2020-01-01 DIAGNOSIS — J30.9 ALLERGIC RHINITIS, UNSPECIFIED SEASONALITY, UNSPECIFIED TRIGGER: ICD-10-CM

## 2020-01-01 DIAGNOSIS — J44.9 COPD, SEVERE (H): ICD-10-CM

## 2020-01-01 DIAGNOSIS — R91.8 PULMONARY NODULES: Primary | ICD-10-CM

## 2020-01-01 DIAGNOSIS — R91.8 PULMONARY NODULES: ICD-10-CM

## 2020-01-01 DIAGNOSIS — J42 CHRONIC BRONCHITIS, UNSPECIFIED CHRONIC BRONCHITIS TYPE (H): Primary | ICD-10-CM

## 2020-01-01 DIAGNOSIS — J44.9 COPD (CHRONIC OBSTRUCTIVE PULMONARY DISEASE) (H): Primary | ICD-10-CM

## 2020-01-01 PROCEDURE — 999N001193 HC VIDEO/TELEPHONE VISIT; NO CHARGE

## 2020-01-01 PROCEDURE — 99443 PR PHYSICIAN TELEPHONE EVALUATION 21-30 MIN: CPT | Mod: 95 | Performed by: INTERNAL MEDICINE

## 2020-01-01 RX ORDER — GUAIFENESIN 600 MG/1
600 TABLET, EXTENDED RELEASE ORAL
COMMUNITY
Start: 2019-01-05

## 2020-01-01 RX ORDER — PROCHLORPERAZINE MALEATE 10 MG
TABLET ORAL
COMMUNITY
Start: 2019-08-13

## 2020-01-01 RX ORDER — LEVOFLOXACIN 750 MG/1
TABLET, FILM COATED ORAL
COMMUNITY
Start: 2020-01-01

## 2020-01-01 RX ORDER — AZELASTINE 1 MG/ML
2 SPRAY, METERED NASAL
COMMUNITY
Start: 2019-05-17

## 2020-01-01 RX ORDER — IPRATROPIUM BROMIDE AND ALBUTEROL SULFATE 2.5; .5 MG/3ML; MG/3ML
3 SOLUTION RESPIRATORY (INHALATION)
COMMUNITY
Start: 2020-01-01

## 2020-01-01 RX ORDER — TRAZODONE HYDROCHLORIDE 50 MG/1
TABLET, FILM COATED ORAL
COMMUNITY
Start: 2020-03-12

## 2020-01-01 RX ORDER — BENZONATATE 100 MG/1
100 CAPSULE ORAL
COMMUNITY
Start: 2019-04-03

## 2020-03-15 ENCOUNTER — HEALTH MAINTENANCE LETTER (OUTPATIENT)
Age: 70
End: 2020-03-15

## 2020-04-27 NOTE — PROGRESS NOTES
"Chantal Bernal is a 69 year old female who is being evaluated via a billable telephone visit.      The patient has been notified of following:     \"This telephone visit will be conducted via a call between you and your physician/provider. We have found that certain health care needs can be provided without the need for a physical exam.  This service lets us provide the care you need with a short phone conversation.  If a prescription is necessary we can send it directly to your pharmacy.  If lab work is needed we can place an order for that and you can then stop by our lab to have the test done at a later time.    Telephone visits are billed at different rates depending on your insurance coverage. During this emergency period, for some insurers they may be billed the same as an in-person visit.  Please reach out to your insurance provider with any questions.    If during the course of the call the physician/provider feels a telephone visit is not appropriate, you will not be charged for this service.\"    Patient has given verbal consent for Telephone visit?  Yes    How would you like to obtain your AVS? [facility]     HPI:  Ms. Chantal Bernal is a 69-year-old female with a past history of severe COPD who is not a lung transplant candidate due to impaired renal function.  She was last seen by me in May 2019 at which point she described her breathing as poor  She was maintained on Spiriva, Pulmicort, Singulair and prednisone 5 mg daily.  We added chronic azithromycin for exacerbation prevention.  Pulmonary rehab was considered but deferred as she was already receiving in home physical therapy.  She was additionally referred to IP for BLVR consideration given poor exercise status.  She saw Dr. Miller in July and was deemed to be a likely candidate.  She has not undergone BLVR yet though.    -breathing is stable  -was in the hospital in early March with pneumonia -- hospitalized for 6 days.  This is the only pneumonia since " our last visit  -Thinks she maybe had 1 AECOPD treated with outpatient steroids over the last year.  -Breathing is at baseline since hospital discharge.   -Persistent cough but no longer producing phlegm  -No hemoptysis, fevers or chills  -Can walk around house without any significant shortness of breath  -Wears O2 at bedtime and prn during the day (2L)  -Continues on all meds as prescribed.  Usually uses albuterol nebs 2-3x/day.      A/P:    1) COPD.  Her current regimen includes Spiriva, Pulmicort, Prednisone 5 mg daily and Singulair.  She has albuterol MDI and nebs available as needed for rescue. She is also on chronic azithromycin.  She cannot tolerate LABA due to nausea, vomiting and shortness of breath.  She has had one hospitalization for pneumonia, and, she thinks, one AECOPD treated as an outpatient since our visit nearly 1 year ago.  Previous PFTs with bronchodilator response ? Asthmatic component to lung disease. Symptoms are otherwise stable and at her respiratory baseline.  Continue the above regimen without changes.  She remains interested in BLVR.  I will reach out to Dr. Miller to ask for an update.    2) Allergic Rhinitis.  Continue daily Zyrtec and Flonase.      3) Lung Cancer screening.  She is a candidate based on age and smoking history.  Given the severity of her underlying lung disease, she would likely be high risk for biopsy even if a suspicious pulmonary nodule were found.  The risks and benefits of proceeding with screening and the the acknowledged limitations of her severe emphysema were discussed at her last visit and she wished to proceed with screening.  Last CT chest was in July 2019 - no pulmonary nodules seen.  Next due July 2020.    4) Chronic hypoxic respiratory failure.  Continue supplemental O2.  This patient requires a portable oxygen concentrator. This patient is not homebound and is mobile within the home. Additionally this patient requires oxygen for exercise, and for frequent  travel to and from clinic appointments. The patient also utilizes oxygen for air travel, and portable tanks cannot be utilized on the airplane.    RV 6 months.    Phone call duration: 12 minutes    Leta Schwartz MD

## 2020-04-27 NOTE — Clinical Note
Marquise Alas, followed up with this patient today -- she is doing well.  I referred her for BLVR consideration last July and she is still very interested but hasn't heard from your group in months and I can't figure out what happened from the paper trail.  I know this type of thing is on hold due to COVID, but Ms. Bernal  (and me!) would appreciate a status update.  Thanks!    Mera

## 2020-12-29 NOTE — PROGRESS NOTES
"Chantal Bernal is a 70 year old female who is being evaluated via a billable telephone visit.      The patient has been notified of following:     \"This telephone visit will be conducted via a call between you and your physician/provider. We have found that certain health care needs can be provided without the need for a physical exam.  This service lets us provide the care you need with a short phone conversation.  If a prescription is necessary we can send it directly to your pharmacy.  If lab work is needed we can place an order for that and you can then stop by our lab to have the test done at a later time.    Telephone visits are billed at different rates depending on your insurance coverage. During this emergency period, for some insurers they may be billed the same as an in-person visit.  Please reach out to your insurance provider with any questions.    If during the course of the call the physician/provider feels a telephone visit is not appropriate, you will not be charged for this service.\"    Patient has given verbal consent for Telephone visit?  Yes    What phone number would you like to be contacted at? [phone]    How would you like to obtain your AVS? Mail a copy    Vitals - Patient Reported  Weight (Patient Reported): 51.3 kg (113 lb)  Height (Patient Reported): 157.5 cm (5' 2\")  BMI (Based on Pt Reported Ht/Wt): 20.67  Pain Score: No Pain (0)      I have reviewed and updated patient's allergy and medication list.    Concerns: ORDERS FOR BREATHING TEST  Refills: NONE      Christiane Tapia [facility]    Phone call duration: 25 minutes    Bishop Miller MD      "

## 2020-12-29 NOTE — LETTER
"12/29/2020       RE: Chantal Bernal  [address] [address] [address]     Dear Colleague,    Thank you for referring your patient, Chantal Bernal, to the [facility] at [facility]. Please see a copy of my visit note below.    Chantal Bernal is a 70 year old female who is being evaluated via a billable telephone visit.      The patient has been notified of following:     \"This telephone visit will be conducted via a call between you and your physician/provider. We have found that certain health care needs can be provided without the need for a physical exam.  This service lets us provide the care you need with a short phone conversation.  If a prescription is necessary we can send it directly to your pharmacy.  If lab work is needed we can place an order for that and you can then stop by our lab to have the test done at a later time.    Telephone visits are billed at different rates depending on your insurance coverage. During this emergency period, for some insurers they may be billed the same as an in-person visit.  Please reach out to your insurance provider with any questions.    If during the course of the call the physician/provider feels a telephone visit is not appropriate, you will not be charged for this service.\"    Patient has given verbal consent for Telephone visit?  Yes    What phone number would you like to be contacted at? [phone]    How would you like to obtain your AVS? Mail a copy    Vitals - Patient Reported  Weight (Patient Reported): 51.3 kg (113 lb)  Height (Patient Reported): 157.5 cm (5' 2\")  BMI (Based on Pt Reported Ht/Wt): 20.67  Pain Score: No Pain (0)      I have reviewed and updated patient's allergy and medication list.    Concerns: ORDERS FOR BREATHING TEST  Refills: NONE      Christiane Tapia CMA    Phone call duration: 25 minutes    Bishop Miller MD        LUNG NODULE & INTERVENTIONAL PULMONARY " "CLINIC  [facility], [facility], [facility]   VIRTUAL TELEPHONE VISIT    Chantal Bernal MRN# [id number]   Age: 70 year old YOB: 1950     Reason for Consultation: lung nodule(s)    Requesting Physician: Leta Schwartz MD  [address] [facility] [address] [facility] [address]  [place] [address]    Chantal Bernal is a 70 year old female who is being evaluated via a billable telephone visit.      The patient has been notified of following: \"This telephone visit will be conducted via a call between you and your physician/provider. We have found that certain health care needs can be provided without the need for a physical exam.  This service lets us provide the care you need with a short phone conversation.  If a prescription is necessary we can send it directly to your pharmacy.  If lab work is needed we can place an order for that and you can then stop by our lab to have the test done at a later time. Telephone visits are billed at different rates depending on your insurance coverage. During this emergency period, for some insurers they may be billed the same as an in-person visit.  Please reach out to your insurance provider with any questions. If during the course of the call the physician/provider feels a telephone visit is not appropriate, you will not be charged for this service.\"    Patient has given verbal consent for Telephone visit?  Yes    How would you like to obtain your AVS? MyChart    Phone call duration: 25 minutes    Additional provider notes: see below     Assessment and Plan:    1. BLVR. We discussed the process, procedure and benefits/risks. I told her that this wouldn't change her COPD classification however may improve her dyspnea score and walking distance. She is agreeable. We will get seleCT to evaluate. Since she is recovering from PNA/AECOPD, I will sched the CT in 6-8wks.      2. Established multiple pulmonary lung nodule(s). Given the " characteristics on current/previous imaging and risk factors; I would classify this to be Intermediate (6-65%) risk for cancer. Has had sub6mm nodules stable since 2018. Cont annual lung cancer screening     Billing: The patient was seen and examined by me and the findings, assessment, and plan as documented was explained to the patient/family who expressed understand.      Bishop Miller MD   of Medicine  Interventional Pulmonology  Department of Pulmonary, Allergy, Critical Care and Sleep Medicine   [facility]  Pager: [phone]           History:      Chantal Bernal is a 68 year old female with sig h/o for COPD who is here for evaluation/followup of BLVR.     - No new resp sx or complaints. Denies dyspnea or cough.   - Here for followup BLVR  - Personal hx of cancer: cervical cancer. Up-to-date on mammo and c-scope  - Family hx of cancer: Uncle had lung cancer  - Exposure hx: Denies asbestos or radon exposure   - Tobacco hx: Past Smoker: 1ppd for 47years. Quit 2012  - My interpretation of the images relevant for this visit includes: nodules, emphysema   - My interpretation of the PFT's relevant for this visit includes: 3/2019. FEV1 0.53, DLCO 41     Culprit Nodule(s):   Multiple bilateral lung nodule(s) that are sub 6 mm. First seen by chest CT on 11/2018. There is no interval change     Other active medical problems include:   - has COPD and follows with Dr. Schwartz in pulmonary. On symbicort, spiriva and low dose azithro. Also uses pulmicort nebs and prednisone 5mg/d. Had PNA/AECOPD last week. Usually exacerbates 1-2 per year. Up-to-date on flu+PNA vaccination. Uses 2L/m O2 RTC.   - had cervical cancer s/p MONTRELL-BSO in 2012                Past Medical History:      Past Medical History:   Diagnosis Date     Anemia      COPD, severe (H)      Elevated serum creatinine      Hypervitaminosis D      Osteoporosis      Osteoporosis      Pulmonary nodules      Renal cyst             Past Surgical History:      Past Surgical History:   Procedure Laterality Date     CHOLECYSTECTOMY       OTHER SURGICAL HISTORY      Stated had a hysterectomy at [facility] around .          Social History:     Social History     Tobacco Use     Smoking status: Former Smoker     Packs/day: 1.00     Years: 47.00     Pack years: 47.00     Types: Cigarettes     Quit date: 2012     Years since quittin.0     Smokeless tobacco: Never Used   Substance Use Topics     Alcohol use: Yes     Comment: minimal          Family History:     Family History   Problem Relation Age of Onset     Cancer Brother         throat     Lung Cancer Paternal Uncle            Allergies:      Allergies   Allergen Reactions     Arformoterol Nausea and Vomiting and Shortness Of Breath     Codeine Unknown and Shortness Of Breath     Trouble breathing     Morphine GI Disturbance          Medications:     Current Outpatient Medications   Medication Sig     acetaminophen (TYLENOL) 500 MG tablet Take by mouth every 6 hours as needed      albuterol (2.5 MG/3ML) 0.083% neb solution TAKE 3 ML IN NEBULIZER EVERY FOUR HOURS AS NEEDED FOR SHORTNESS OF BREATH     aspirin 81 MG tablet Take 1 tablet (81 mg) by mouth daily     azelastine (ASTELIN) 0.1 % nasal spray Spray 2 sprays in nostril     azithromycin (ZITHROMAX) 250 MG tablet Take 1 tablet (250 mg) by mouth Every Mon, Wed, Fri Morning     benzonatate (TESSALON) 100 MG capsule Take 100 mg by mouth     budesonide (PULMICORT) 1 MG/2ML SUSP neb solution Inhale 1 mg into the lungs     buPROPion (WELLBUTRIN XL) 300 MG 24 hr tablet Take 300 mg by mouth     furosemide (LASIX) 20 MG tablet Take 20 mg by mouth     guaiFENesin (MUCINEX) 600 MG 12 hr tablet Take 600 mg by mouth     ipratropium - albuterol 0.5 mg/2.5 mg/3 mL (DUONEB) 0.5-2.5 (3) MG/3ML neb solution Inhale 3 mLs into the lungs     levofloxacin (LEVAQUIN) 750 MG tablet      LORazepam (ATIVAN) 0.5 MG tablet as needed     losartan  (COZAAR) 50 MG tablet Take 50 mg by mouth     metoprolol succinate (TOPROL-XL) 50 MG 24 hr tablet TAKE 1/2 TABLET BY MOUTH TWO TIMES A DAY     montelukast (SINGULAIR) 10 MG tablet Take 10 mg by mouth     predniSONE (DELTASONE) 5 MG tablet TAKE 1 TAB BY MOUTH ONE TIME A DAY. TAKE DAILY WITH FOOD.  INDICATIONS: CHRONIC OBSTRUCTIVE LUNG DISEASE, TO START WHEN DONE WITH TAPER     prochlorperazine (COMPAZINE) 10 MG tablet TAKE 1 TAB EVERY 6-8 HRS AS NEEDED FOR NAUSEA/VOMITING     Tiotropium Bromide Monohydrate (SPIRIVA HANDIHALER IN)      traZODone (DESYREL) 50 MG tablet      VENTOLIN  (90 Base) MCG/ACT Inhaler      cholecalciferol (VITAMIN D3) 25 mcg (1000 units) capsule Take 1,000 Units by mouth     morphine (MSIR) 15 MG IR tablet as needed     No current facility-administered medications for this visit.           Review of Systems:     CONSTITUTIONAL: negative for fever, chills, change in weight  INTEGUMENTARY/SKIN: no rash or obvious new lesions  ENT/MOUTH: no sore throat, new sinus pain or nasal drainage  RESP: see interval history  CV: negative for chest pain, palpitations or peripheral edema  GI: no nausea, vomiting, change in stools  : no dysuria  MUSCULOSKELETAL: no myalgias, arthralgias  ENDOCRINE: blood sugars with adequate control  PSYCHIATRIC: mood stable  LYMPHATIC: no new lymphadenopathy  HEME: no bleeding or easy bruisability  NEURO: no numbness, weakness, headaches         Physical Exam:      A comprehensive physical examination is deferred due to PHE (public health emergency) telephone visit restrictions.         Current Laboratory Data:   All laboratory and imaging data reviewed.    No results found for this or any previous visit (from the past 24 hour(s)).         Previous Pulmonary Function Testing     FVC-Pred   Date Value Ref Range Status   11/13/2018 2.56 L      FVC-Pre   Date Value Ref Range Status   11/13/2018 1.39 L      FVC-%Pred-Pre   Date Value Ref Range Status   11/13/2018 54 %       FEV1-Pre   Date Value Ref Range Status   11/13/2018 0.55 L      FEV1-%Pred-Pre   Date Value Ref Range Status   11/13/2018 27 %      FEV1FVC-Pred   Date Value Ref Range Status   11/13/2018 76 %      FEV1FVC-Pre   Date Value Ref Range Status   11/13/2018 40 %      No results found for: 20029  FEFMax-Pred   Date Value Ref Range Status   11/13/2018 5.20 L/sec      FEFMax-Pre   Date Value Ref Range Status   11/13/2018 2.01 L/sec      FEFMax-%Pred-Pre   Date Value Ref Range Status   11/13/2018 38 %      ExpTime-Pre   Date Value Ref Range Status   11/13/2018 12.45 sec      FIFMax-Pre   Date Value Ref Range Status   11/13/2018 3.20 L/sec      FEV1FEV6-Pred   Date Value Ref Range Status   11/13/2018 79 %      FEV1FEV6-Pre   Date Value Ref Range Status   11/13/2018 46 %      No results found for: 20055         Previous Chest Imaging   No images are attached to the encounter.  No images are attached to the encounter or orders placed in the encounter.         Previous Cardiology Imaging   No results found for this or any previous visit (from the past 8760 hour(s)).                   Again, thank you for allowing me to participate in the care of your patient.      Sincerely,    Bishop Miller MD

## 2020-12-29 NOTE — PROGRESS NOTES
"LUNG NODULE & INTERVENTIONAL PULMONARY CLINIC  [facility]ICS [facility], [facility], [facility]   VIRTUAL TELEPHONE VISIT    Chantal Bernal MRN# [id number]   Age: 70 year old YOB: 1950     Reason for Consultation: lung nodule(s)    Requesting Physician: Leta Schwartz MD  [address] [facility] [address]  [place] [address]    Chantal Bernal is a 70 year old female who is being evaluated via a billable telephone visit.      The patient has been notified of following: \"This telephone visit will be conducted via a call between you and your physician/provider. We have found that certain health care needs can be provided without the need for a physical exam.  This service lets us provide the care you need with a short phone conversation.  If a prescription is necessary we can send it directly to your pharmacy.  If lab work is needed we can place an order for that and you can then stop by our lab to have the test done at a later time. Telephone visits are billed at different rates depending on your insurance coverage. During this emergency period, for some insurers they may be billed the same as an in-person visit.  Please reach out to your insurance provider with any questions. If during the course of the call the physician/provider feels a telephone visit is not appropriate, you will not be charged for this service.\"    Patient has given verbal consent for Telephone visit?  Yes    How would you like to obtain your AVS? [facility] [facility]    Phone call duration: 25 minutes    Additional provider notes: see below     Assessment and Plan:    1. BLVR. We discussed the process, procedure and benefits/risks. I told her that this wouldn't change her COPD classification however may improve her dyspnea score and walking distance. She is agreeable. We will get seleCT to evaluate. Since she is recovering from PNA/AECOPD, I will sched the CT in 6-8wks.      2. Established multiple pulmonary " lung nodule(s). Given the characteristics on current/previous imaging and risk factors; I would classify this to be Intermediate (6-65%) risk for cancer. Has had sub6mm nodules stable since 2018. Cont annual lung cancer screening     Billing: The patient was seen and examined by me and the findings, assessment, and plan as documented was explained to the patient/family who expressed understand.      Bishop Miller MD   of Medicine  Interventional Pulmonology  Department of Pulmonary, Allergy, Critical Care and Sleep Medicine   [facility]  Pager: [phone]           History:      Chantal Bernal is a 68 year old female with sig h/o for COPD who is here for evaluation/followup of BLVR.     - No new resp sx or complaints. Denies dyspnea or cough.   - Here for followup BLVR  - Personal hx of cancer: cervical cancer. Up-to-date on mammo and c-scope  - Family hx of cancer: Uncle had lung cancer  - Exposure hx: Denies asbestos or radon exposure   - Tobacco hx: Past Smoker: 1ppd for 47years. Quit 2012  - My interpretation of the images relevant for this visit includes: nodules, emphysema   - My interpretation of the PFT's relevant for this visit includes: 3/2019. FEV1 0.53, DLCO 41     Culprit Nodule(s):   Multiple bilateral lung nodule(s) that are sub 6 mm. First seen by chest CT on 11/2018. There is no interval change     Other active medical problems include:   - has COPD and follows with Dr. Schwartz in pulmonary. On symbicort, spiriva and low dose azithro. Also uses pulmicort nebs and prednisone 5mg/d. Had PNA/AECOPD last week. Usually exacerbates 1-2 per year. Up-to-date on flu+PNA vaccination. Uses 2L/m O2 RTC.   - had cervical cancer s/p MONTRELL-BSO in 2012                Past Medical History:      Past Medical History:   Diagnosis Date     Anemia      COPD, severe (H)      Elevated serum creatinine      Hypervitaminosis D      Osteoporosis      Osteoporosis      Pulmonary  nodules      Renal cyst            Past Surgical History:      Past Surgical History:   Procedure Laterality Date     CHOLECYSTECTOMY       OTHER SURGICAL HISTORY      Stated had a hysterectomy at [facility] around .          Social History:     Social History     Tobacco Use     Smoking status: Former Smoker     Packs/day: 1.00     Years: 47.00     Pack years: 47.00     Types: Cigarettes     Quit date: 2012     Years since quittin.0     Smokeless tobacco: Never Used   Substance Use Topics     Alcohol use: Yes     Comment: minimal          Family History:     Family History   Problem Relation Age of Onset     Cancer Brother         throat     Lung Cancer Paternal Uncle            Allergies:      Allergies   Allergen Reactions     Arformoterol Nausea and Vomiting and Shortness Of Breath     Codeine Unknown and Shortness Of Breath     Trouble breathing     Morphine GI Disturbance          Medications:     Current Outpatient Medications   Medication Sig     acetaminophen (TYLENOL) 500 MG tablet Take by mouth every 6 hours as needed      albuterol (2.5 MG/3ML) 0.083% neb solution TAKE 3 ML IN NEBULIZER EVERY FOUR HOURS AS NEEDED FOR SHORTNESS OF BREATH     aspirin 81 MG tablet Take 1 tablet (81 mg) by mouth daily     azelastine (ASTELIN) 0.1 % nasal spray Spray 2 sprays in nostril     azithromycin (ZITHROMAX) 250 MG tablet Take 1 tablet (250 mg) by mouth Every Mon, Wed, Fri Morning     benzonatate (TESSALON) 100 MG capsule Take 100 mg by mouth     budesonide (PULMICORT) 1 MG/2ML SUSP neb solution Inhale 1 mg into the lungs     buPROPion (WELLBUTRIN XL) 300 MG 24 hr tablet Take 300 mg by mouth     furosemide (LASIX) 20 MG tablet Take 20 mg by mouth     guaiFENesin (MUCINEX) 600 MG 12 hr tablet Take 600 mg by mouth     ipratropium - albuterol 0.5 mg/2.5 mg/3 mL (DUONEB) 0.5-2.5 (3) MG/3ML neb solution Inhale 3 mLs into the lungs     levofloxacin (LEVAQUIN) 750 MG tablet      LORazepam (ATIVAN) 0.5 MG  tablet as needed     losartan (COZAAR) 50 MG tablet Take 50 mg by mouth     metoprolol succinate (TOPROL-XL) 50 MG 24 hr tablet TAKE 1/2 TABLET BY MOUTH TWO TIMES A DAY     montelukast (SINGULAIR) 10 MG tablet Take 10 mg by mouth     predniSONE (DELTASONE) 5 MG tablet TAKE 1 TAB BY MOUTH ONE TIME A DAY. TAKE DAILY WITH FOOD.  INDICATIONS: CHRONIC OBSTRUCTIVE LUNG DISEASE, TO START WHEN DONE WITH TAPER     prochlorperazine (COMPAZINE) 10 MG tablet TAKE 1 TAB EVERY 6-8 HRS AS NEEDED FOR NAUSEA/VOMITING     Tiotropium Bromide Monohydrate (SPIRIVA HANDIHALER IN)      traZODone (DESYREL) 50 MG tablet      VENTOLIN  (90 Base) MCG/ACT Inhaler      cholecalciferol (VITAMIN D3) 25 mcg (1000 units) capsule Take 1,000 Units by mouth     morphine (MSIR) 15 MG IR tablet as needed     No current facility-administered medications for this visit.           Review of Systems:     CONSTITUTIONAL: negative for fever, chills, change in weight  INTEGUMENTARY/SKIN: no rash or obvious new lesions  ENT/MOUTH: no sore throat, new sinus pain or nasal drainage  RESP: see interval history  CV: negative for chest pain, palpitations or peripheral edema  GI: no nausea, vomiting, change in stools  : no dysuria  MUSCULOSKELETAL: no myalgias, arthralgias  ENDOCRINE: blood sugars with adequate control  PSYCHIATRIC: mood stable  LYMPHATIC: no new lymphadenopathy  HEME: no bleeding or easy bruisability  NEURO: no numbness, weakness, headaches         Physical Exam:      A comprehensive physical examination is deferred due to PHE (public health emergency) telephone visit restrictions.         Current Laboratory Data:   All laboratory and imaging data reviewed.    No results found for this or any previous visit (from the past 24 hour(s)).         Previous Pulmonary Function Testing     FVC-Pred   Date Value Ref Range Status   11/13/2018 2.56 L      FVC-Pre   Date Value Ref Range Status   11/13/2018 1.39 L      FVC-%Pred-Pre   Date Value Ref  Range Status   11/13/2018 54 %      FEV1-Pre   Date Value Ref Range Status   11/13/2018 0.55 L      FEV1-%Pred-Pre   Date Value Ref Range Status   11/13/2018 27 %      FEV1FVC-Pred   Date Value Ref Range Status   11/13/2018 76 %      FEV1FVC-Pre   Date Value Ref Range Status   11/13/2018 40 %      No results found for: 20029  FEFMax-Pred   Date Value Ref Range Status   11/13/2018 5.20 L/sec      FEFMax-Pre   Date Value Ref Range Status   11/13/2018 2.01 L/sec      FEFMax-%Pred-Pre   Date Value Ref Range Status   11/13/2018 38 %      ExpTime-Pre   Date Value Ref Range Status   11/13/2018 12.45 sec      FIFMax-Pre   Date Value Ref Range Status   11/13/2018 3.20 L/sec      FEV1FEV6-Pred   Date Value Ref Range Status   11/13/2018 79 %      FEV1FEV6-Pre   Date Value Ref Range Status   11/13/2018 46 %      No results found for: [address]55         Previous Chest Imaging   No images are attached to the encounter.  No images are attached to the encounter or orders placed in the encounter.         Previous Cardiology Imaging   No results found for this or any previous visit (from the past 8760 hour(s)).

## 2021-01-01 ENCOUNTER — TELEPHONE (OUTPATIENT)
Facility: CLINIC | Age: 71
End: 2021-01-01

## 2021-01-01 ENCOUNTER — VIRTUAL VISIT (OUTPATIENT)
Dept: PULMONOLOGY | Facility: CLINIC | Age: 71
End: 2021-01-01
Attending: INTERNAL MEDICINE
Payer: COMMERCIAL

## 2021-01-01 ENCOUNTER — HOSPITAL ENCOUNTER (OUTPATIENT)
Facility: CLINIC | Age: 71
End: 2021-01-01
Attending: INTERNAL MEDICINE | Admitting: INTERNAL MEDICINE
Payer: COMMERCIAL

## 2021-01-01 ENCOUNTER — TELEPHONE (OUTPATIENT)
Dept: PULMONOLOGY | Facility: CLINIC | Age: 71
End: 2021-01-01

## 2021-01-01 ENCOUNTER — ANCILLARY PROCEDURE (OUTPATIENT)
Dept: CT IMAGING | Facility: CLINIC | Age: 71
End: 2021-01-01
Attending: INTERNAL MEDICINE
Payer: COMMERCIAL

## 2021-01-01 ENCOUNTER — HEALTH MAINTENANCE LETTER (OUTPATIENT)
Age: 71
End: 2021-01-01

## 2021-01-01 DIAGNOSIS — J43.2 CENTRILOBULAR EMPHYSEMA (H): ICD-10-CM

## 2021-01-01 DIAGNOSIS — J44.9 COPD, SEVERE (H): ICD-10-CM

## 2021-01-01 DIAGNOSIS — J43.2 CENTRILOBULAR EMPHYSEMA (H): Primary | ICD-10-CM

## 2021-01-01 DIAGNOSIS — J96.21 ACUTE AND CHRONIC RESPIRATORY FAILURE WITH HYPOXIA (H): ICD-10-CM

## 2021-01-01 DIAGNOSIS — J44.9 COPD, SEVERE (H): Primary | ICD-10-CM

## 2021-01-01 PROCEDURE — 99214 OFFICE O/P EST MOD 30 MIN: CPT | Mod: 95 | Performed by: INTERNAL MEDICINE

## 2021-01-01 PROCEDURE — 999N001193 HC VIDEO/TELEPHONE VISIT; NO CHARGE

## 2021-01-01 PROCEDURE — 71250 CT THORAX DX C-: CPT | Performed by: RADIOLOGY

## 2021-01-01 RX ORDER — DOXYCYCLINE 100 MG/1
100 CAPSULE ORAL 2 TIMES DAILY
COMMUNITY

## 2021-03-04 NOTE — TELEPHONE ENCOUNTER
[facility] Call Center    Phone Message    May a detailed message be left on voicemail: yes     Reason for Call: Other: Pt's Daughter Marcella called in today stating that they were scheduled for PFTs today but left after Pt's CT as they were not aware of the apt. She is wondering if they are able to have the PFTs done at a clinic near Pt's home in [place]. Please call her back to discuss.  Pt daughter would like a call back asap [phone]. As pt was in hospital and is not abloe to do Pft and is wondering if she should still come in    Action Taken: Message routed to:  [facility] ([facility]    Travel Screening: Not Applicable

## 2021-03-08 PROBLEM — J96.21 ACUTE AND CHRONIC RESPIRATORY FAILURE WITH HYPOXIA (H): Status: ACTIVE | Noted: 2021-01-01

## 2021-03-08 NOTE — TELEPHONE ENCOUNTER
Pt hospitalized in [place], [place] last week. Pt's daughter, Marcella,  is calling to let us know that patient will not be coming down for PFTs or 6MWT, she is wondering whether pt should complete f/u visit.  Advised that pt should complete visit with Dr. Schwartz today to ensure appropriate on going plan of care.  Marcella verbalized understanding and agreement with plan.  No further questions at this time.

## 2021-03-08 NOTE — NURSING NOTE
Orders for PFTs, 6MWT and pulmonary rehab faxed to [facility] at [phone], as requested.  Pt instructed to schedule once she is back to baseline.

## 2021-03-08 NOTE — Clinical Note
Wants PFTs and 6MWT done locally (in [place]) - please fax orders.  FYI I asked her to hold off on scheduling these until she is back to her respiratory baseline.  Referral for pulm rehab also placed today - would like to do this in [place] once she is feeling better.  Thx.  KP

## 2021-03-08 NOTE — LETTER
3/8/2021         RE: Chantal Bernal  [id number] [address] [address]        Dear Colleague,    Thank you for referring your patient, Chantal Bernal, to the [facility] LUNG SCIENCE AND [facility]. Please see a copy of my visit note below.    Chantal is a 70 year old who is being evaluated via a billable telephone visit.      What phone number would you like to be contacted at? [phone]  How would you like to obtain your AVS? [email or web address]     Phone call duration: 12 minutes    History of Present Illness:    Ms. Chantal Bernal is a 70-year-old female with a past history of severe COPD who is not a lung transplant candidate due to impaired renal function.  She was last seen by me in  April 2020 at which point she described her breathing was stable.  She has been maintained on Spiriva, Pulmicort nebs when sick vs Symbicort when feeling ok, Singulair, prednisone 5 mg daily, and chronic azithromycin.  She was additionally referred to IP for BLVR consideration given poor exercise status.  She most recently saw Dr. Miller in late December at which point a tentative plan was made to move forward with BLVR.      -Patient was hospitalized in [place], [place] and was just  discharged yesterday.  She had pneumonia.  Did not require intensive care.  -Is not feeling back to baseline yet but is overall improving.  -Still has a cough productive of dark green sputum.  No hemoptysis.  -Is completing a course of Doxycycline and Cefdinir - has 7 days of each left.  Also on prednisone burst  -In November she fell and spent a month in the hospital.  Went home for 3 days and then developed pneumonia and was back in the hospital for 2 weeks.  -Continues to wear O2 at 2L - presently using continuously and has been since her fall and subsequent hospitalization in November.  -Uses albuterol nebs as needed - rare use of this.  -Just finished all of her in home PT about 1 week ago.        Data:    CT Chest  3/4/2021:  1. Extensive centrilobular emphysematous change.   2. Groundglass opacity and centrilobular thickening noted laterally in the right upper lobe and at the left lung base. Subpleural reticulations at both lung bases. Superimposed infectious process should be the prime consideration. These were not present on the most recent comparison of 7/2/2019  3. No concerning nodules identified within the lung.  4. 3.5 cm descending aorta.      Assessment and Plan:     1) COPD.  Her current regimen includes Spiriva, Pulmicort vs Symbicort, Prednisone 5 mg daily and Singulair.  She has albuterol MDI and nebs available as needed for rescue. She is also on low dose chronic azithromycin, 250 mg MWF. Unfortunately, she continues to have problems with recurrent pneumonias despite this maximal regimen.  We will continue the above regimen without changes at this time.  She was reminded to stop her chronic azithromycin while taking other antibiotics and then to resume it on the first Monday, Wednesday or Friday following completion.  She should also complete her course of prednisone as prescribed, tapering back down to 5 mg daily afterwards.  Given recent significant hospitalizations she is an excellent candidate for pulmonary rehab.  This was discussed with her and she is amenable to it.  I will place an order for pulmonary rehab to be done in [place].  We will also obtain the PFTs and 6MWT planned for today locally - although I have asked her to defer scheduling these until she has returned to her respiratory baseline.  She remains interested in BLVR and is scheduled for follow up with Dr. Miller later this week.     2) Allergic Rhinitis.  Continue daily Zyrtec and Flonase.       3) Lung Cancer screening.  She is a candidate based on age and smoking history.  Given the severity of her underlying lung disease, she would likely be high risk for biopsy even if a suspicious pulmonary nodule were found.  The risks and benefits of  proceeding with screening and the the acknowledged limitations of her severe emphysema have been previously discussed.  Her most recent CT from this month did not identify any concerning pulmonary nodules.  Defer management of nodules to Dr. Miller or would plan to continue annual screening with repeat CT chest March 2022.     4) Chronic hypoxic respiratory failure.  Continue supplemental O2 as prescribed.  This patient requires a portable oxygen concentrator. This patient is not homebound and is mobile within the home. Additionally this patient requires oxygen for exercise, and for frequent travel to and from clinic appointments. The patient also utilizes oxygen for air travel, and portable tanks cannot be utilized on the airplane.     RV 6 months.    She is up to date on Prevnar (2016), Pneumovax (2019), and a seasonal flu vaccine.    Mera Schwartz MD  Pulmonary and Critical Care Medicine

## 2021-03-08 NOTE — PROGRESS NOTES
Chantal is a 70 year old who is being evaluated via a billable telephone visit.      What phone number would you like to be contacted at? [phone]  How would you like to obtain your AVS? Sherine     Phone call duration: 12 minutes    History of Present Illness:    Ms. Chantal Bernal is a 70-year-old female with a past history of severe COPD who is not a lung transplant candidate due to impaired renal function.  She was last seen by me in  April 2020 at which point she described her breathing was stable.  She has been maintained on Spiriva, Pulmicort nebs when sick vs Symbicort when feeling ok, Singulair, prednisone 5 mg daily, and chronic azithromycin.  She was additionally referred to IP for BLVR consideration given poor exercise status.  She most recently saw Dr. Miller in late December at which point a tentative plan was made to move forward with BLVR.      -Patient was hospitalized in [place], [place] and was just  discharged yesterday.  She had pneumonia.  Did not require intensive care.  -Is not feeling back to baseline yet but is overall improving.  -Still has a cough productive of dark green sputum.  No hemoptysis.  -Is completing a course of Doxycycline and Cefdinir - has 7 days of each left.  Also on prednisone burst  -In November she fell and spent a month in the hospital.  Went home for 3 days and then developed pneumonia and was back in the hospital for 2 weeks.  -Continues to wear O2 at 2L - presently using continuously and has been since her fall and subsequent hospitalization in November.  -Uses albuterol nebs as needed - rare use of this.  -Just finished all of her in home PT about 1 week ago.        Data:    CT Chest 3/4/2021:  1. Extensive centrilobular emphysematous change.   2. Groundglass opacity and centrilobular thickening noted laterally in the right upper lobe and at the left lung base. Subpleural reticulations at both lung bases. Superimposed infectious process should be the prime consideration.  These were not present on the most recent comparison of 7/2/2019  3. No concerning nodules identified within the lung.  4. 3.5 cm descending aorta.      Assessment and Plan:     1) COPD.  Her current regimen includes Spiriva, Pulmicort vs Symbicort, Prednisone 5 mg daily and Singulair.  She has albuterol MDI and nebs available as needed for rescue. She is also on low dose chronic azithromycin, 250 mg MWF. Unfortunately, she continues to have problems with recurrent pneumonias despite this maximal regimen.  We will continue the above regimen without changes at this time.  She was reminded to stop her chronic azithromycin while taking other antibiotics and then to resume it on the first Monday, Wednesday or Friday following completion.  She should also complete her course of prednisone as prescribed, tapering back down to 5 mg daily afterwards.  Given recent significant hospitalizations she is an excellent candidate for pulmonary rehab.  This was discussed with her and she is amenable to it.  I will place an order for pulmonary rehab to be done in [place].  We will also obtain the PFTs and 6MWT planned for today locally - although I have asked her to defer scheduling these until she has returned to her respiratory baseline.  She remains interested in BLVR and is scheduled for follow up with Dr. Miller later this week.     2) Allergic Rhinitis.  Continue daily Zyrtec and Flonase.       3) Lung Cancer screening.  She is a candidate based on age and smoking history.  Given the severity of her underlying lung disease, she would likely be high risk for biopsy even if a suspicious pulmonary nodule were found.  The risks and benefits of proceeding with screening and the the acknowledged limitations of her severe emphysema have been previously discussed.  Her most recent CT from this month did not identify any concerning pulmonary nodules.  Defer management of nodules to Dr. Miller or would plan to continue annual screening with  repeat CT chest March 2022.     4) Chronic hypoxic respiratory failure.  Continue supplemental O2 as prescribed.  This patient requires a portable oxygen concentrator. This patient is not homebound and is mobile within the home. Additionally this patient requires oxygen for exercise, and for frequent travel to and from clinic appointments. The patient also utilizes oxygen for air travel, and portable tanks cannot be utilized on the airplane.     RV 6 months.    She is up to date on Prevnar (2016), Pneumovax (2019), and a seasonal flu vaccine.    Mera Schwartz MD  Pulmonary and Critical Care Medicine

## 2021-03-09 NOTE — LETTER
"3/9/2021       RE: Chantal Bernal  [id number] [address] [address]     Dear Colleague,    Thank you for referring your patient, Chantal Bernal, to the [facility] at [facility]. Please see a copy of my visit note below.    LUNG NODULE & INTERVENTIONAL PULMONARY CLINIC  [facility], [facility]   VIRTUAL TELEPHONE VISIT    Chantal Bernal MRN# [id number]   Age: 70 year old YOB: 1950     Reason for Consultation: BLVR    Chantal Bernal is a 70 year old female who is being evaluated via a billable telephone visit.      The patient has been notified of following: \"This telephone visit will be conducted via a call between you and your physician/provider. We have found that certain health care needs can be provided without the need for a physical exam.  This service lets us provide the care you need with a short phone conversation.  If a prescription is necessary we can send it directly to your pharmacy.  If lab work is needed we can place an order for that and you can then stop by our lab to have the test done at a later time. Telephone visits are billed at different rates depending on your insurance coverage. During this emergency period, for some insurers they may be billed the same as an in-person visit.  Please reach out to your insurance provider with any questions. If during the course of the call the physician/provider feels a telephone visit is not appropriate, you will not be charged for this service.\"    Patient has given verbal consent for Telephone visit?  Yes    How would you like to obtain your AVS? Julianat    Phone call duration: 25 minutes    Additional provider notes: see below     Assessment and Plan:    1. BLVR. We discussed the process, procedure and benefits/risks. SeleCT shows good targets in RUL+RML and KARLA. We will plan BLVR to KARLA in 6-8 wks " given recent infection/AECOPD.        2. Established multiple pulmonary lung nodule(s). Given the characteristics on current/previous imaging and risk factors; I would classify this to be Intermediate (6-65%) risk for cancer. Has had sub6mm nodules stable since 2018. Cont annual lung cancer screening      Billing: I spent 30 minutes including face to face, chart review, personal and documented interpretation of results, counseling and coordination of care about the issues above.      Bishop Miller MD   of Medicine  Interventional Pulmonology  Department of Pulmonary, Allergy, Critical Care and Sleep Medicine   [facility]  Pager: [phone]          History:     Chantal Bernal is a 68 year old female with sig h/o for COPD who is here for evaluation/followup of BLVR.     - Had pneumonia last wk requiring 1d hospitalization. Treated with antibiotics and prednisone.   - Here for followup BLVR  - Personal hx of cancer: cervical cancer. Up-to-date on mammo and c-scope  - Family hx of cancer: Uncle had lung cancer  - Exposure hx: Denies asbestos or radon exposure   - Tobacco hx: Past Smoker: 1ppd for 47years. Quit 2012  - My interpretation of the images relevant for this visit includes: nodules, emphysema   - My interpretation of the PFT's relevant for this visit includes: 3/2019. FEV1 0.53, DLCO 41     Culprit Nodule(s):   Multiple bilateral lung nodule(s) that are sub 6 mm. First seen by chest CT on 11/2018. There is no interval change     Other active medical problems include:   - has COPD and follows with Dr. Schwartz in pulmonary. On symbicort, spiriva and low dose azithro. Also uses pulmicort nebs and prednisone 5mg/d. Had PNA/AECOPD last week. Usually exacerbates 1-2 per year. Up-to-date on flu+PNA vaccination. Uses 2L/m O2 RTC.     - had cervical cancer s/p MONTRELL-BSO in 2012              Past Medical History:      Past Medical History:   Diagnosis Date     Anemia       COPD, severe (H)      Elevated serum creatinine      Hypervitaminosis D      Osteoporosis      Osteoporosis      Pulmonary nodules      Renal cyst            Past Surgical History:      Past Surgical History:   Procedure Laterality Date     CHOLECYSTECTOMY       OTHER SURGICAL HISTORY      Stated had a hysterectomy at [facility] around .          Social History:     Social History     Tobacco Use     Smoking status: Former Smoker     Packs/day: 1.00     Years: 47.00     Pack years: 47.00     Types: Cigarettes     Quit date: 2012     Years since quittin.1     Smokeless tobacco: Never Used   Substance Use Topics     Alcohol use: Yes     Comment: minimal          Family History:     Family History   Problem Relation Age of Onset     Cancer Brother         throat     Lung Cancer Paternal Uncle            Allergies:      Allergies   Allergen Reactions     Arformoterol Nausea and Vomiting and Shortness Of Breath     Codeine Unknown and Shortness Of Breath     Trouble breathing     Morphine GI Disturbance          Medications:     Current Outpatient Medications   Medication Sig     acetaminophen (TYLENOL) 500 MG tablet Take by mouth every 6 hours as needed      albuterol (2.5 MG/3ML) 0.083% neb solution TAKE 3 ML IN NEBULIZER EVERY FOUR HOURS AS NEEDED FOR SHORTNESS OF BREATH     aspirin 81 MG tablet Take 1 tablet (81 mg) by mouth daily     azelastine (ASTELIN) 0.1 % nasal spray Spray 2 sprays in nostril     azithromycin (ZITHROMAX) 250 MG tablet Take 1 tablet (250 mg) by mouth Every Mon, Wed, Fri Morning     benzonatate (TESSALON) 100 MG capsule Take 100 mg by mouth     budesonide (PULMICORT) 1 MG/2ML SUSP neb solution Inhale 1 mg into the lungs     buPROPion (WELLBUTRIN XL) 300 MG 24 hr tablet Take 300 mg by mouth     cholecalciferol (VITAMIN D3) 25 mcg (1000 units) capsule Take 1,000 Units by mouth     doxycycline hyclate (VIBRAMYCIN) 100 MG capsule Take 100 mg by mouth 2 times daily     furosemide  (LASIX) 20 MG tablet Take 20 mg by mouth     guaiFENesin (MUCINEX) 600 MG 12 hr tablet Take 600 mg by mouth     ipratropium - albuterol 0.5 mg/2.5 mg/3 mL (DUONEB) 0.5-2.5 (3) MG/3ML neb solution Inhale 3 mLs into the lungs     levofloxacin (LEVAQUIN) 750 MG tablet      LORazepam (ATIVAN) 0.5 MG tablet as needed     losartan (COZAAR) 50 MG tablet Take 50 mg by mouth     metoprolol succinate (TOPROL-XL) 50 MG 24 hr tablet TAKE 1/2 TABLET BY MOUTH TWO TIMES A DAY     montelukast (SINGULAIR) 10 MG tablet Take 10 mg by mouth     morphine (MSIR) 15 MG IR tablet as needed     predniSONE (DELTASONE) 5 MG tablet 40 mg daily      prochlorperazine (COMPAZINE) 10 MG tablet TAKE 1 TAB EVERY 6-8 HRS AS NEEDED FOR NAUSEA/VOMITING     Tiotropium Bromide Monohydrate (SPIRIVA HANDIHALER IN)      traZODone (DESYREL) 50 MG tablet      VENTOLIN  (90 Base) MCG/ACT Inhaler      No current facility-administered medications for this visit.           Review of Systems:     CONSTITUTIONAL: negative for fever, chills, change in weight  INTEGUMENTARY/SKIN: no rash or obvious new lesions  ENT/MOUTH: no sore throat, new sinus pain or nasal drainage  RESP: see interval history  CV: negative for chest pain, palpitations or peripheral edema  GI: no nausea, vomiting, change in stools  : no dysuria  MUSCULOSKELETAL: no myalgias, arthralgias  ENDOCRINE: blood sugars with adequate control  PSYCHIATRIC: mood stable  LYMPHATIC: no new lymphadenopathy  HEME: no bleeding or easy bruisability  NEURO: no numbness, weakness, headaches         Physical Exam:      A comprehensive physical examination is deferred due to PHE (public health emergency) telephone visit restrictions.         Current Laboratory Data:   All laboratory and imaging data reviewed.    No results found for this or any previous visit (from the past 24 hour(s)).         Previous Pulmonary Function Testing     FVC-Pred   Date Value Ref Range Status   11/13/2018 2.56 L      FVC-Pre    Date Value Ref Range Status   11/13/2018 1.39 L      FVC-%Pred-Pre   Date Value Ref Range Status   11/13/2018 54 %      FEV1-Pre   Date Value Ref Range Status   11/13/2018 0.55 L      FEV1-%Pred-Pre   Date Value Ref Range Status   11/13/2018 27 %      FEV1FVC-Pred   Date Value Ref Range Status   11/13/2018 76 %      FEV1FVC-Pre   Date Value Ref Range Status   11/13/2018 40 %      No results found for: 20029  FEFMax-Pred   Date Value Ref Range Status   11/13/2018 5.20 L/sec      FEFMax-Pre   Date Value Ref Range Status   11/13/2018 2.01 L/sec      FEFMax-%Pred-Pre   Date Value Ref Range Status   11/13/2018 38 %      ExpTime-Pre   Date Value Ref Range Status   11/13/2018 12.45 sec      FIFMax-Pre   Date Value Ref Range Status   11/13/2018 3.20 L/sec      FEV1FEV6-Pred   Date Value Ref Range Status   11/13/2018 79 %      FEV1FEV6-Pre   Date Value Ref Range Status   11/13/2018 46 %      No results found for: 20055         Previous Chest Imaging   No images are attached to the encounter.  No images are attached to the encounter or orders placed in the encounter.         Previous Cardiology Imaging   No results found for this or any previous visit (from the past 8760 hour(s)).               Again, thank you for allowing me to participate in the care of your patient.      Sincerely,    Bishop Miller MD

## 2021-03-09 NOTE — PROGRESS NOTES
"LUNG NODULE & INTERVENTIONAL PULMONARY CLINIC  [facility], [facility], [facility]   VIRTUAL TELEPHONE VISIT    Chantal Bernal MRN# [id number]   Age: 70 year old YOB: 1950     Reason for Consultation: BLVR    Chantal Bernal is a 70 year old female who is being evaluated via a billable telephone visit.      The patient has been notified of following: \"This telephone visit will be conducted via a call between you and your physician/provider. We have found that certain health care needs can be provided without the need for a physical exam.  This service lets us provide the care you need with a short phone conversation.  If a prescription is necessary we can send it directly to your pharmacy.  If lab work is needed we can place an order for that and you can then stop by our lab to have the test done at a later time. Telephone visits are billed at different rates depending on your insurance coverage. During this emergency period, for some insurers they may be billed the same as an in-person visit.  Please reach out to your insurance provider with any questions. If during the course of the call the physician/provider feels a telephone visit is not appropriate, you will not be charged for this service.\"    Patient has given verbal consent for Telephone visit?  Yes    How would you like to obtain your AVS? Sherine    Phone call duration: 25 minutes    Additional provider notes: see below     Assessment and Plan:    1. BLVR. We discussed the process, procedure and benefits/risks. SeleCT shows good targets in RUL+RML and KARLA. We will plan BLVR to KARLA in 6-8 wks given recent infection/AECOPD.        2. Established multiple pulmonary lung nodule(s). Given the characteristics on current/previous imaging and risk factors; I would classify this to be Intermediate (6-65%) risk for cancer. Has had sub6mm nodules stable since 2018. Cont annual lung cancer " screening      Billing: I spent 30 minutes including face to face, chart review, personal and documented interpretation of results, counseling and coordination of care about the issues above.      Bishop Miller MD   of Medicine  Interventional Pulmonology  Department of Pulmonary, Allergy, Critical Care and Sleep Medicine   [facility]  Pager: [phone]          History:     Chantal Bernal is a 68 year old female with sig h/o for COPD who is here for evaluation/followup of BLVR.     - Had pneumonia last wk requiring 1d hospitalization. Treated with antibiotics and prednisone.   - Here for followup BLVR  - Personal hx of cancer: cervical cancer. Up-to-date on mammo and c-scope  - Family hx of cancer: Uncle had lung cancer  - Exposure hx: Denies asbestos or radon exposure   - Tobacco hx: Past Smoker: 1ppd for 47years. Quit 2012  - My interpretation of the images relevant for this visit includes: nodules, emphysema   - My interpretation of the PFT's relevant for this visit includes: 3/2019. FEV1 0.53, DLCO 41     Culprit Nodule(s):   Multiple bilateral lung nodule(s) that are sub 6 mm. First seen by chest CT on 11/2018. There is no interval change     Other active medical problems include:   - has COPD and follows with Dr. Schwartz in pulmonary. On symbicort, spiriva and low dose azithro. Also uses pulmicort nebs and prednisone 5mg/d. Had PNA/AECOPD last week. Usually exacerbates 1-2 per year. Up-to-date on flu+PNA vaccination. Uses 2L/m O2 RTC.     - had cervical cancer s/p MONTRELL-BSO in 2012              Past Medical History:      Past Medical History:   Diagnosis Date     Anemia      COPD, severe (H)      Elevated serum creatinine      Hypervitaminosis D      Osteoporosis      Osteoporosis      Pulmonary nodules      Renal cyst            Past Surgical History:      Past Surgical History:   Procedure Laterality Date     CHOLECYSTECTOMY       OTHER SURGICAL HISTORY       Stated had a hysterectomy at [facility] around .          Social History:     Social History     Tobacco Use     Smoking status: Former Smoker     Packs/day: 1.00     Years: 47.00     Pack years: 47.00     Types: Cigarettes     Quit date: 2012     Years since quittin.1     Smokeless tobacco: Never Used   Substance Use Topics     Alcohol use: Yes     Comment: minimal          Family History:     Family History   Problem Relation Age of Onset     Cancer Brother         throat     Lung Cancer Paternal Uncle            Allergies:      Allergies   Allergen Reactions     Arformoterol Nausea and Vomiting and Shortness Of Breath     Codeine Unknown and Shortness Of Breath     Trouble breathing     Morphine GI Disturbance          Medications:     Current Outpatient Medications   Medication Sig     acetaminophen (TYLENOL) 500 MG tablet Take by mouth every 6 hours as needed      albuterol (2.5 MG/3ML) 0.083% neb solution TAKE 3 ML IN NEBULIZER EVERY FOUR HOURS AS NEEDED FOR SHORTNESS OF BREATH     aspirin 81 MG tablet Take 1 tablet (81 mg) by mouth daily     azelastine (ASTELIN) 0.1 % nasal spray Spray 2 sprays in nostril     azithromycin (ZITHROMAX) 250 MG tablet Take 1 tablet (250 mg) by mouth Every Mon, Wed, Fri Morning     benzonatate (TESSALON) 100 MG capsule Take 100 mg by mouth     budesonide (PULMICORT) 1 MG/2ML SUSP neb solution Inhale 1 mg into the lungs     buPROPion (WELLBUTRIN XL) 300 MG 24 hr tablet Take 300 mg by mouth     cholecalciferol (VITAMIN D3) 25 mcg (1000 units) capsule Take 1,000 Units by mouth     doxycycline hyclate (VIBRAMYCIN) 100 MG capsule Take 100 mg by mouth 2 times daily     furosemide (LASIX) 20 MG tablet Take 20 mg by mouth     guaiFENesin (MUCINEX) 600 MG 12 hr tablet Take 600 mg by mouth     ipratropium - albuterol 0.5 mg/2.5 mg/3 mL (DUONEB) 0.5-2.5 (3) MG/3ML neb solution Inhale 3 mLs into the lungs     levofloxacin (LEVAQUIN) 750 MG tablet      LORazepam (ATIVAN) 0.5  MG tablet as needed     losartan (COZAAR) 50 MG tablet Take 50 mg by mouth     metoprolol succinate (TOPROL-XL) 50 MG 24 hr tablet TAKE 1/2 TABLET BY MOUTH TWO TIMES A DAY     montelukast (SINGULAIR) 10 MG tablet Take 10 mg by mouth     morphine (MSIR) 15 MG IR tablet as needed     predniSONE (DELTASONE) 5 MG tablet 40 mg daily      prochlorperazine (COMPAZINE) 10 MG tablet TAKE 1 TAB EVERY 6-8 HRS AS NEEDED FOR NAUSEA/VOMITING     Tiotropium Bromide Monohydrate (SPIRIVA HANDIHALER IN)      traZODone (DESYREL) 50 MG tablet      VENTOLIN  (90 Base) MCG/ACT Inhaler      No current facility-administered medications for this visit.           Review of Systems:     CONSTITUTIONAL: negative for fever, chills, change in weight  INTEGUMENTARY/SKIN: no rash or obvious new lesions  ENT/MOUTH: no sore throat, new sinus pain or nasal drainage  RESP: see interval history  CV: negative for chest pain, palpitations or peripheral edema  GI: no nausea, vomiting, change in stools  : no dysuria  MUSCULOSKELETAL: no myalgias, arthralgias  ENDOCRINE: blood sugars with adequate control  PSYCHIATRIC: mood stable  LYMPHATIC: no new lymphadenopathy  HEME: no bleeding or easy bruisability  NEURO: no numbness, weakness, headaches         Physical Exam:      A comprehensive physical examination is deferred due to [facility] (public health emergency) telephone visit restrictions.         Current Laboratory Data:   All laboratory and imaging data reviewed.    No results found for this or any previous visit (from the past 24 hour(s)).         Previous Pulmonary Function Testing     FVC-Pred   Date Value Ref Range Status   11/13/2018 2.56 L      FVC-Pre   Date Value Ref Range Status   11/13/2018 1.39 L      FVC-%Pred-Pre   Date Value Ref Range Status   11/13/2018 54 %      FEV1-Pre   Date Value Ref Range Status   11/13/2018 0.55 L      FEV1-%Pred-Pre   Date Value Ref Range Status   11/13/2018 27 %      FEV1FVC-Pred   Date Value Ref Range Status    11/13/2018 76 %      FEV1FVC-Pre   Date Value Ref Range Status   11/13/2018 40 %      No results found for: 20029  FEFMax-Pred   Date Value Ref Range Status   11/13/2018 5.20 L/sec      FEFMax-Pre   Date Value Ref Range Status   11/13/2018 2.01 L/sec      FEFMax-%Pred-Pre   Date Value Ref Range Status   11/13/2018 38 %      ExpTime-Pre   Date Value Ref Range Status   11/13/2018 12.45 sec      FIFMax-Pre   Date Value Ref Range Status   11/13/2018 3.20 L/sec      FEV1FEV6-Pred   Date Value Ref Range Status   11/13/2018 79 %      FEV1FEV6-Pre   Date Value Ref Range Status   11/13/2018 46 %      No results found for: 20055         Previous Chest Imaging   No images are attached to the encounter.  No images are attached to the encounter or orders placed in the encounter.         Previous Cardiology Imaging   No results found for this or any previous visit (from the past 8760 hour(s)).

## 2021-03-11 PROBLEM — J43.2 CENTRILOBULAR EMPHYSEMA (H): Status: ACTIVE | Noted: 2021-01-01

## 2021-03-11 NOTE — TELEPHONE ENCOUNTER
Spoke with patient to schedule procedure with Dr. Miller   Procedure was scheduled on 04/30/2021 at [facility] OR  Patient will have H&P with Sujatha Mayfield      Patient is aware a COVID-19 test is needed before their procedure. The test should be with-in 4 days of their procedure.   Test Details: Location [place]    Patient is aware a / is needed day of surgery.   Surgery letter was sent via [employer] and the mail per patient request, patient has my direct contact information for any further questions.
